# Patient Record
Sex: FEMALE
[De-identification: names, ages, dates, MRNs, and addresses within clinical notes are randomized per-mention and may not be internally consistent; named-entity substitution may affect disease eponyms.]

---

## 2017-04-28 PROBLEM — H02.006: Status: ACTIVE | Noted: 2017-04-28

## 2017-04-28 PROBLEM — H35.363 RETINAL DRUSEN, BILATERAL: Status: ACTIVE | Noted: 2017-04-28

## 2017-04-28 PROBLEM — H02.003 ENTROPION AND TRICHIASIS OF EYELID, RIGHT: Status: ACTIVE | Noted: 2017-04-28

## 2017-04-28 PROBLEM — Z96.1 PSEUDOPHAKIA OF BOTH EYES: Status: ACTIVE | Noted: 2017-04-28

## 2017-05-02 PROCEDURE — 83970 ASSAY OF PARATHORMONE: CPT | Performed by: INTERNAL MEDICINE

## 2017-05-02 PROCEDURE — 86334 IMMUNOFIX E-PHORESIS SERUM: CPT | Performed by: INTERNAL MEDICINE

## 2017-05-02 PROCEDURE — 83883 ASSAY NEPHELOMETRY NOT SPEC: CPT | Performed by: INTERNAL MEDICINE

## 2017-05-02 PROCEDURE — 84165 PROTEIN E-PHORESIS SERUM: CPT | Performed by: INTERNAL MEDICINE

## 2017-07-20 PROCEDURE — 87086 URINE CULTURE/COLONY COUNT: CPT | Performed by: INTERNAL MEDICINE

## 2017-07-20 PROCEDURE — 81001 URINALYSIS AUTO W/SCOPE: CPT | Performed by: INTERNAL MEDICINE

## 2017-07-21 PROCEDURE — 82570 ASSAY OF URINE CREATININE: CPT | Performed by: INTERNAL MEDICINE

## 2017-07-21 PROCEDURE — 84156 ASSAY OF PROTEIN URINE: CPT | Performed by: INTERNAL MEDICINE

## 2017-08-31 ENCOUNTER — HOSPITAL (OUTPATIENT)
Dept: OTHER | Age: 82
End: 2017-08-31
Attending: OTOLARYNGOLOGY

## 2017-09-01 ENCOUNTER — CHARTING TRANS (OUTPATIENT)
Dept: OTHER | Age: 82
End: 2017-09-01

## 2017-09-01 PROCEDURE — 82607 VITAMIN B-12: CPT | Performed by: INTERNAL MEDICINE

## 2017-09-01 PROCEDURE — 82746 ASSAY OF FOLIC ACID SERUM: CPT | Performed by: INTERNAL MEDICINE

## 2017-10-12 ENCOUNTER — HOSPITAL ENCOUNTER (OUTPATIENT)
Facility: HOSPITAL | Age: 82
Setting detail: HOSPITAL OUTPATIENT SURGERY
Discharge: HOME | End: 2017-10-12
Attending: OTOLARYNGOLOGY | Admitting: OTOLARYNGOLOGY
Payer: MEDICARE

## 2017-10-12 ENCOUNTER — SURGERY (OUTPATIENT)
Age: 82
End: 2017-10-12

## 2017-10-12 ENCOUNTER — ANESTHESIA (OUTPATIENT)
Dept: SURGERY | Facility: HOSPITAL | Age: 82
End: 2017-10-12

## 2017-10-12 ENCOUNTER — ANESTHESIA EVENT (OUTPATIENT)
Dept: SURGERY | Facility: HOSPITAL | Age: 82
End: 2017-10-12

## 2017-10-12 VITALS
DIASTOLIC BLOOD PRESSURE: 98 MMHG | TEMPERATURE: 98 F | HEART RATE: 76 BPM | RESPIRATION RATE: 16 BRPM | HEIGHT: 68 IN | SYSTOLIC BLOOD PRESSURE: 134 MMHG | OXYGEN SATURATION: 96 % | WEIGHT: 145.5 LBS | BODY MASS INDEX: 22.05 KG/M2

## 2017-10-12 DIAGNOSIS — H02.003 ENTROPION OF RIGHT EYELID: ICD-10-CM

## 2017-10-12 PROCEDURE — 08SQ0ZZ REPOSITION RIGHT LOWER EYELID, OPEN APPROACH: ICD-10-PCS | Performed by: OTOLARYNGOLOGY

## 2017-10-12 RX ORDER — LIDOCAINE HYDROCHLORIDE AND EPINEPHRINE 10; 10 MG/ML; UG/ML
INJECTION, SOLUTION INFILTRATION; PERINEURAL AS NEEDED
Status: DISCONTINUED | OUTPATIENT
Start: 2017-10-12 | End: 2017-10-12 | Stop reason: HOSPADM

## 2017-10-12 RX ORDER — ACETAMINOPHEN 500 MG
1000 TABLET ORAL ONCE AS NEEDED
Status: DISCONTINUED | OUTPATIENT
Start: 2017-10-12 | End: 2017-10-12

## 2017-10-12 RX ORDER — MEPERIDINE HYDROCHLORIDE 25 MG/ML
12.5 INJECTION INTRAMUSCULAR; INTRAVENOUS; SUBCUTANEOUS AS NEEDED
Status: DISCONTINUED | OUTPATIENT
Start: 2017-10-12 | End: 2017-10-12

## 2017-10-12 RX ORDER — SODIUM CHLORIDE, SODIUM LACTATE, POTASSIUM CHLORIDE, CALCIUM CHLORIDE 600; 310; 30; 20 MG/100ML; MG/100ML; MG/100ML; MG/100ML
INJECTION, SOLUTION INTRAVENOUS CONTINUOUS
Status: DISCONTINUED | OUTPATIENT
Start: 2017-10-12 | End: 2017-10-12

## 2017-10-12 RX ORDER — MIDAZOLAM HYDROCHLORIDE 1 MG/ML
1 INJECTION INTRAMUSCULAR; INTRAVENOUS EVERY 5 MIN PRN
Status: DISCONTINUED | OUTPATIENT
Start: 2017-10-12 | End: 2017-10-12

## 2017-10-12 RX ORDER — HYDROCODONE BITARTRATE AND ACETAMINOPHEN 5; 325 MG/1; MG/1
2 TABLET ORAL AS NEEDED
Status: DISCONTINUED | OUTPATIENT
Start: 2017-10-12 | End: 2017-10-12

## 2017-10-12 RX ORDER — METOCLOPRAMIDE HYDROCHLORIDE 5 MG/ML
10 INJECTION INTRAMUSCULAR; INTRAVENOUS AS NEEDED
Status: DISCONTINUED | OUTPATIENT
Start: 2017-10-12 | End: 2017-10-12

## 2017-10-12 RX ORDER — HYDROMORPHONE HYDROCHLORIDE 1 MG/ML
0.4 INJECTION, SOLUTION INTRAMUSCULAR; INTRAVENOUS; SUBCUTANEOUS EVERY 5 MIN PRN
Status: DISCONTINUED | OUTPATIENT
Start: 2017-10-12 | End: 2017-10-12

## 2017-10-12 RX ORDER — ONDANSETRON 2 MG/ML
4 INJECTION INTRAMUSCULAR; INTRAVENOUS AS NEEDED
Status: DISCONTINUED | OUTPATIENT
Start: 2017-10-12 | End: 2017-10-12

## 2017-10-12 RX ORDER — BACITRACIN 500 [USP'U]/G
OINTMENT OPHTHALMIC AS NEEDED
Status: DISCONTINUED | OUTPATIENT
Start: 2017-10-12 | End: 2017-10-12

## 2017-10-12 RX ORDER — HYDROCODONE BITARTRATE AND ACETAMINOPHEN 5; 325 MG/1; MG/1
1 TABLET ORAL AS NEEDED
Status: DISCONTINUED | OUTPATIENT
Start: 2017-10-12 | End: 2017-10-12

## 2017-10-12 RX ORDER — CEFAZOLIN SODIUM 1 G/3ML
INJECTION, POWDER, FOR SOLUTION INTRAMUSCULAR; INTRAVENOUS
Status: DISCONTINUED | OUTPATIENT
Start: 2017-10-12 | End: 2017-10-12 | Stop reason: HOSPADM

## 2017-10-12 RX ORDER — CEPHALEXIN 500 MG/1
500 CAPSULE ORAL 3 TIMES DAILY
Qty: 21 CAPSULE | Refills: 0 | Status: SHIPPED | OUTPATIENT
Start: 2017-10-12 | End: 2017-10-19

## 2017-10-12 RX ORDER — NALOXONE HYDROCHLORIDE 0.4 MG/ML
80 INJECTION, SOLUTION INTRAMUSCULAR; INTRAVENOUS; SUBCUTANEOUS AS NEEDED
Status: DISCONTINUED | OUTPATIENT
Start: 2017-10-12 | End: 2017-10-12

## 2017-10-12 NOTE — BRIEF OP NOTE
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

## 2017-10-12 NOTE — H&P
CHIEF COMPLAINT: right lower lid is turned inward.      HISTORY OF PRESENT ILLNESS: This is an 18-year-old female kindly by Dr. Nicky Sanchez for evaluation of entropion. The patient has had the lid turning in for the past 2 months.  She does get some occasional tear Patient with a lower lid entropion on the right. We discussed the risks and benefits of performing a lower lid tightening to address this.  She understands the risks and benefits and is interested in proceeding.    All of her questions were answered to the

## 2017-10-12 NOTE — ANESTHESIA POSTPROCEDURE EVALUATION
[facility] Patient Status:  Hospital Outpatient Surgery   Age/Gender 81year old female MRN [id number]   Location [facility] Attending Loretta Mcfarland MD   [facility] Day # 0 PCP Anderson Garza MD

## 2017-10-12 NOTE — OPERATIVE REPORT
[facility]    PATIENT'S NAME: Nessa Reyez   ATTENDING PHYSICIAN: Sharif Becerra M.D. OPERATING PHYSICIAN: Sharif Becerra M.D.    PATIENT ACCOUNT#:   [de-identified]    LOCATION:  [address] [place] [address] 10  MEDICAL RECORD #:   [id number] crow's feet for approximately 1.5 cm. I then dissected down through skin in the lateral canthal area and then through the orbicularis muscle. I identified the avascular plane between the orbital septum and the orbicularis.   I spread my tenotomy scissors running fashion. That portion measured 1.5 cm of the 2-layer closure. The eye was then irrigated with copious amounts of BSS. Bacitracin ophthalmologic was placed over the lower lid suture line. At this point, the procedure was terminated.   She tolerat

## 2017-10-12 NOTE — ANESTHESIA PREPROCEDURE EVALUATION
PRE-OP EVALUATION    Patient Name: Sean López    Pre-op Diagnosis: Entropion of right eyelid [A18.260]    Procedure(s):  RIGHT LOWER LID ENTROPION REPAIR    Surgeon(s) and Role:     Tia Murillo MD - Primary    Pre-op vitals reviewed. 09/01/2017       Lab Results  Component Value Date    09/01/2017   K 3.8 09/01/2017   K 3.90 07/18/2017    09/01/2017   CO2 31.6 09/01/2017   BUN 27 (H) 09/01/2017   CREATSERUM 1.25 (H) 09/01/2017   GLU 74 09/01/2017   CA 9.7 07/21/2017

## 2019-11-13 PROBLEM — I50.22 CHRONIC SYSTOLIC CHF (CONGESTIVE HEART FAILURE) (HCC): Status: ACTIVE | Noted: 2019-11-13

## 2019-12-13 ENCOUNTER — HOSPITAL ENCOUNTER (OUTPATIENT)
Dept: INTERVENTIONAL RADIOLOGY/VASCULAR | Facility: HOSPITAL | Age: 84
Discharge: HOME | End: 2019-12-13
Attending: INTERNAL MEDICINE | Admitting: INTERNAL MEDICINE
Payer: MEDICARE

## 2019-12-13 VITALS
SYSTOLIC BLOOD PRESSURE: 149 MMHG | TEMPERATURE: 98 F | WEIGHT: 146 LBS | HEIGHT: 68 IN | HEART RATE: 62 BPM | RESPIRATION RATE: 15 BRPM | OXYGEN SATURATION: 96 % | BODY MASS INDEX: 22.13 KG/M2 | DIASTOLIC BLOOD PRESSURE: 81 MMHG

## 2019-12-13 DIAGNOSIS — I48.0 PAF (PAROXYSMAL ATRIAL FIBRILLATION) (HCC): ICD-10-CM

## 2019-12-13 PROCEDURE — 5A2204Z RESTORATION OF CARDIAC RHYTHM, SINGLE: ICD-10-PCS | Performed by: INTERNAL MEDICINE

## 2019-12-13 PROCEDURE — 93005 ELECTROCARDIOGRAM TRACING: CPT

## 2019-12-13 PROCEDURE — 80048 BASIC METABOLIC PNL TOTAL CA: CPT | Performed by: INTERNAL MEDICINE

## 2019-12-13 PROCEDURE — 93010 ELECTROCARDIOGRAM REPORT: CPT | Performed by: INTERNAL MEDICINE

## 2019-12-13 PROCEDURE — 92960 CARDIOVERSION ELECTRIC EXT: CPT

## 2019-12-13 PROCEDURE — 36415 COLL VENOUS BLD VENIPUNCTURE: CPT

## 2019-12-13 RX ORDER — SODIUM CHLORIDE 9 MG/ML
INJECTION, SOLUTION INTRAVENOUS CONTINUOUS
Status: DISCONTINUED | OUTPATIENT
Start: 2019-12-13 | End: 2019-12-13

## 2019-12-13 RX ORDER — AMIODARONE HYDROCHLORIDE 200 MG/1
200 TABLET ORAL DAILY
Qty: 90 TABLET | Refills: 3 | Status: SHIPPED | OUTPATIENT
Start: 2019-12-13 | End: 2020-01-10

## 2019-12-13 RX ADMIN — Medication 30 MG: at 11:49:00

## 2019-12-13 RX ADMIN — SODIUM CHLORIDE: 9 INJECTION, SOLUTION INTRAVENOUS at 10:15:00

## 2019-12-13 NOTE — PROGRESS NOTES
Cardioversion completed at bedside with Dr. Al Zuniga without complications. Pt A/Ox4. DEREK. VSS. 12-lead EKG complete, NSR post-cv. Denies any pain or discomfort.  E-script for amiodarone sent and AVS/discharge instructions given -- patient verbalized unders

## 2019-12-13 NOTE — H&P
81year old female     She lives in [facility] but visits her son here in the summers.     She had an episode of atrial fibrillation in 2000     She gets episodes of palpitations lasting 30 minutes every 3 months that don't bother her much.     Recently nicole Resp 18   Ht 5' 8\" (1.727 m)   Wt 146 lb (66.2 kg)   SpO2 96%   BMI 22.20 kg/m²   GENERAL: well developed, well nourished, in no apparent distress  EYES: sclera anicteric  HEENT: normocephalic  NECK: no JVD, no carotid bruits, no thyromegaly  RESPIRATOR

## 2019-12-14 PROBLEM — Z95.810 S/P IMPLANTATION OF AUTOMATIC CARDIOVERTER/DEFIBRILLATOR (AICD): Status: RESOLVED | Noted: 2019-12-14 | Resolved: 2019-12-14

## 2019-12-14 PROBLEM — Z79.01 CURRENT USE OF LONG TERM ANTICOAGULATION: Status: ACTIVE | Noted: 2019-12-14

## 2019-12-14 PROBLEM — Z79.899 LONG TERM CURRENT USE OF AMIODARONE: Status: ACTIVE | Noted: 2019-12-14

## 2019-12-14 PROBLEM — Z98.890 HISTORY OF CARDIOVERSION: Status: ACTIVE | Noted: 2019-12-14

## 2019-12-14 PROBLEM — Z95.810 S/P IMPLANTATION OF AUTOMATIC CARDIOVERTER/DEFIBRILLATOR (AICD): Status: ACTIVE | Noted: 2019-12-14

## 2019-12-14 PROBLEM — Z92.89 HISTORY OF CARDIOVERSION: Status: ACTIVE | Noted: 2019-12-14

## 2021-07-22 PROBLEM — D68.69 SECONDARY HYPERCOAGULABLE STATE (HCC): Status: ACTIVE | Noted: 2021-07-22

## 2021-08-22 PROBLEM — M25.561 CHRONIC PAIN OF BOTH KNEES: Status: RESOLVED | Noted: 2021-08-22 | Resolved: 2021-08-22

## 2021-08-22 PROBLEM — M25.561 CHRONIC PAIN OF BOTH KNEES: Status: ACTIVE | Noted: 2021-08-22

## 2021-08-22 PROBLEM — M25.562 CHRONIC PAIN OF BOTH KNEES: Status: RESOLVED | Noted: 2021-08-22 | Resolved: 2021-08-22

## 2021-08-22 PROBLEM — G89.29 CHRONIC PAIN OF RIGHT KNEE: Status: ACTIVE | Noted: 2021-08-22

## 2021-08-22 PROBLEM — M25.562 CHRONIC PAIN OF BOTH KNEES: Status: ACTIVE | Noted: 2021-08-22

## 2021-08-22 PROBLEM — G89.29 CHRONIC PAIN OF BOTH KNEES: Status: RESOLVED | Noted: 2021-08-22 | Resolved: 2021-08-22

## 2021-08-22 PROBLEM — M25.561 CHRONIC PAIN OF RIGHT KNEE: Status: ACTIVE | Noted: 2021-08-22

## 2021-08-22 PROBLEM — G89.29 CHRONIC PAIN OF BOTH KNEES: Status: ACTIVE | Noted: 2021-08-22

## 2021-09-22 PROBLEM — I70.0 AORTIC ATHEROSCLEROSIS: Status: ACTIVE | Noted: 2021-09-22

## 2021-09-22 PROBLEM — I77.1 TORTUOUS AORTA: Status: ACTIVE | Noted: 2021-09-22

## 2021-09-22 PROBLEM — I77.1 TORTUOUS AORTA (HCC): Status: ACTIVE | Noted: 2021-09-22

## 2021-09-22 PROBLEM — I70.0 AORTIC ATHEROSCLEROSIS (HCC): Status: ACTIVE | Noted: 2021-09-22

## 2024-12-07 ENCOUNTER — HOSPITAL ENCOUNTER (INPATIENT)
Facility: HOSPITAL | Age: 89
LOS: 3 days | Discharge: HOME | End: 2024-12-10
Attending: EMERGENCY MEDICINE | Admitting: HOSPITALIST
Payer: MEDICARE

## 2024-12-07 ENCOUNTER — APPOINTMENT (OUTPATIENT)
Dept: GENERAL RADIOLOGY | Facility: HOSPITAL | Age: 89
End: 2024-12-07
Attending: EMERGENCY MEDICINE
Payer: MEDICARE

## 2024-12-07 DIAGNOSIS — I1A.0 RESISTANT HYPERTENSION: ICD-10-CM

## 2024-12-07 DIAGNOSIS — I50.9 ACUTE CONGESTIVE HEART FAILURE, UNSPECIFIED HEART FAILURE TYPE (HCC): Primary | ICD-10-CM

## 2024-12-07 LAB
Q-T INTERVAL: 350 MS
QRS DURATION: 80 MS
QTC CALCULATION (BEZET): 451 MS
R AXIS: -31 DEGREES
T AXIS: 63 DEGREES
VENTRICULAR RATE: 100 BPM

## 2024-12-07 PROCEDURE — 99285 EMERGENCY DEPT VISIT HI MDM: CPT

## 2024-12-07 PROCEDURE — 71045 X-RAY EXAM CHEST 1 VIEW: CPT | Performed by: EMERGENCY MEDICINE

## 2024-12-07 PROCEDURE — 93010 ELECTROCARDIOGRAM REPORT: CPT

## 2024-12-07 PROCEDURE — 96374 THER/PROPH/DIAG INJ IV PUSH: CPT

## 2024-12-07 PROCEDURE — 96375 TX/PRO/DX INJ NEW DRUG ADDON: CPT

## 2024-12-07 PROCEDURE — 93005 ELECTROCARDIOGRAM TRACING: CPT

## 2024-12-07 RX ORDER — AMOXICILLIN 500 MG/1
500 CAPSULE ORAL 2 TIMES DAILY
COMMUNITY
Start: 2024-12-05 | End: 2024-12-10

## 2024-12-07 RX ORDER — AMIODARONE HYDROCHLORIDE 200 MG/1
200 TABLET ORAL DAILY
Status: DISCONTINUED | OUTPATIENT
Start: 2024-12-08 | End: 2024-12-10

## 2024-12-07 RX ORDER — SENNOSIDES 8.6 MG
17.2 TABLET ORAL NIGHTLY PRN
Status: DISCONTINUED | OUTPATIENT
Start: 2024-12-07 | End: 2024-12-10

## 2024-12-07 RX ORDER — SODIUM PHOSPHATE, DIBASIC AND SODIUM PHOSPHATE, MONOBASIC 7; 19 G/230ML; G/230ML
1 ENEMA RECTAL ONCE AS NEEDED
Status: DISCONTINUED | OUTPATIENT
Start: 2024-12-07 | End: 2024-12-10

## 2024-12-07 RX ORDER — FUROSEMIDE 10 MG/ML
40 INJECTION INTRAMUSCULAR; INTRAVENOUS ONCE
Status: COMPLETED | OUTPATIENT
Start: 2024-12-07 | End: 2024-12-07

## 2024-12-07 RX ORDER — DILTIAZEM HYDROCHLORIDE 5 MG/ML
10 INJECTION INTRAVENOUS ONCE
Status: COMPLETED | OUTPATIENT
Start: 2024-12-07 | End: 2024-12-07

## 2024-12-07 RX ORDER — ONDANSETRON 2 MG/ML
4 INJECTION INTRAMUSCULAR; INTRAVENOUS EVERY 6 HOURS PRN
Status: DISCONTINUED | OUTPATIENT
Start: 2024-12-07 | End: 2024-12-10

## 2024-12-07 RX ORDER — METOCLOPRAMIDE HYDROCHLORIDE 5 MG/ML
10 INJECTION INTRAMUSCULAR; INTRAVENOUS EVERY 8 HOURS PRN
Status: DISCONTINUED | OUTPATIENT
Start: 2024-12-07 | End: 2024-12-10

## 2024-12-07 RX ORDER — LABETALOL HYDROCHLORIDE 5 MG/ML
20 INJECTION, SOLUTION INTRAVENOUS ONCE
Status: COMPLETED | OUTPATIENT
Start: 2024-12-07 | End: 2024-12-07

## 2024-12-07 RX ORDER — LABETALOL HYDROCHLORIDE 5 MG/ML
10 INJECTION, SOLUTION INTRAVENOUS EVERY 4 HOURS PRN
Status: DISCONTINUED | OUTPATIENT
Start: 2024-12-07 | End: 2024-12-10

## 2024-12-07 RX ORDER — BISACODYL 10 MG
10 SUPPOSITORY, RECTAL RECTAL
Status: DISCONTINUED | OUTPATIENT
Start: 2024-12-07 | End: 2024-12-10

## 2024-12-07 RX ORDER — FUROSEMIDE 10 MG/ML
40 INJECTION INTRAMUSCULAR; INTRAVENOUS
Status: DISCONTINUED | OUTPATIENT
Start: 2024-12-07 | End: 2024-12-10

## 2024-12-07 RX ORDER — POLYETHYLENE GLYCOL 3350 17 G/17G
17 POWDER, FOR SOLUTION ORAL DAILY PRN
Status: DISCONTINUED | OUTPATIENT
Start: 2024-12-07 | End: 2024-12-10

## 2024-12-07 RX ORDER — ACETAMINOPHEN 500 MG
500 TABLET ORAL EVERY 4 HOURS PRN
Status: DISCONTINUED | OUTPATIENT
Start: 2024-12-07 | End: 2024-12-10

## 2024-12-07 NOTE — PROGRESS NOTES
12/07/24 1600 12/07/24 1611 12/07/24 1615   Vital Signs   BP (!) 162/112 (!) 131/101 (!) 161/94   MAP (mmHg) (!) 129 (!) 111 (!) 106   BP Location Left arm Left arm Left arm   BP Method Automatic Automatic Automatic   Patient Position Lying Sitting Standing

## 2024-12-07 NOTE — ED PROVIDER NOTES
Patient Seen in: [facility] Emergency Department      History     Chief Complaint   Patient presents with    Difficulty Breathing     c/o 2-3 weeks CHIP, dyspnea on exertion, worse when laying flat, elevated trop, BNP 8000, d dimer negative, 100% on RA, hx of afib/chf, coming private vehicle     Stated Complaint:     Subjective:   HPI      Patient comes to the emergency department with 2 to 3 weeks of increasing shortness of breath, particular with exertion and when lying flat.  The patient has had no chest pain.  She does have a previous history of atrial fibrillation which was well-controlled in recent months.  Patient went to [facility] immediate care today and was noted to have a slightly elevated troponin along with a significantly elevated beta natruretic peptide.  She has had no syncope or near syncope.  She has had no weakness.  She has noted some edema in both her lower extremities which has been present over the past week.    Objective:     Past Medical History:    Atrial fibrillation (HCC)    EP cards: Dr. David    Chronic renal insufficiency, stage 3 (moderate) (HCC)    History of cervical cancer    stage 1B    Osteoarthritis    left hip    Osteoporosis    rheum: Dr. Sammy Rodriges              Past Surgical History:   Procedure Laterality Date    Colonoscopy      2005, unremarkable    Remv cataract extracap,insert lens  8/22/2012    Procedure: LEFT PHACOEMULSIFICATION OF CATARACT WITH INTRAOCULAR LENS IMPLANT [id number];  Surgeon: Paramjit Chin MD;  Location: [facility], [facility]    Total abdom hysterectomy                  Social History     Socioeconomic History    Marital status:     Number of children: 2   Occupational History    Occupation: Retired head of security [place]   Tobacco Use    Smoking status: Never    Smokeless tobacco: Never   Vaping Use    Vaping status: Never Used   Substance and Sexual Activity    Alcohol use: No     Alcohol/week: 0.0 standard drinks of  alcohol    Drug use: No                  Physical Exam     ED Triage Vitals   BP 12/07/24 1440 (!) 181/122   Pulse 12/07/24 1438 103   Resp 12/07/24 1438 22   Temp 12/07/24 1438 97.3 °F (36.3 °C)   Temp src 12/07/24 1438 Temporal   SpO2 12/07/24 1438 97 %   O2 Device 12/07/24 1438 None (Room air)       Current Vitals:   Vital Signs  BP: (!) 181/122  Pulse: 96  Resp: 12  Temp: 97.3 °F (36.3 °C)  Temp src: Temporal    Oxygen Therapy  SpO2: 100 %  O2 Device: None (Room air)        Physical Exam  Vitals and nursing note reviewed.   Constitutional:       Appearance: She is well-developed.   HENT:      Head: Normocephalic.   Cardiovascular:      Rate and Rhythm: Tachycardia present. Rhythm irregular.      Heart sounds: Normal heart sounds. No murmur heard.  Pulmonary:      Effort: Pulmonary effort is normal. No respiratory distress.      Breath sounds: Normal breath sounds.   Abdominal:      General: Bowel sounds are normal.      Palpations: Abdomen is soft.      Tenderness: There is no abdominal tenderness. There is no rebound.   Musculoskeletal:         General: No tenderness. Normal range of motion.      Cervical back: Normal range of motion and neck supple.      Right lower leg: Edema present.      Left lower leg: Edema present.      Comments: Bilateral symmetric lower extremity edema   Lymphadenopathy:      Cervical: No cervical adenopathy.   Skin:     General: Skin is warm and dry.      Findings: No rash.   Neurological:      Mental Status: She is alert and oriented to person, place, and time.      Sensory: No sensory deficit.            ED Course   Labs Reviewed - No data to display  EKG    Rate, intervals and axes as noted on EKG Report.  Rate: 100  Rhythm: Atrial Fibrillation  Reading: No obvious acute ischemic ST or T wave abnormalities           ED Course as of 12/07/24 1524  ------------------------------------------------------------  Time: 12/07 1523  Value: BP(!): 155/94  Comment: After patient received  labetalol       Review of patient's records from [facility] notes that patient's beta nitric peptide is elevated at 8368.  High limit of normal is 450.  Additionally, patient's troponin was noted to be elevated at 18.68 with a high limit of normal at 14.  Patient CBC is unremarkable.  BUN and creatinine were elevated, but consistent with her baseline.       MDM      Patient comes to the emergency department with worsening shortness of breath and orthopnea.  Differential diagnosis includes heart failure and acute myocardial ischemia.  Patient was noted to have elevated blood pressure in the emergency department.  I consulted with cardiology who recommended proceeding with Lasix and to address the patient's blood pressure, given the patient's history of atrial fibrillation, felt that labetalol would be the most appropriate current medication.  Because the patient is otherwise in no distress, he did not feel that the patient required admission to the ICU.  While in the emergency department, patient was given Lasix and labetalol.  She remained comfortable while in the emergency department.  She was hospitalized and sent to cardiac telemetry.    Admission disposition: 12/7/2024  3:10 PM           Medical Decision Making      Disposition and Plan     Clinical Impression:  1. Acute congestive heart failure, unspecified heart failure type (HCC)    2. Resistant hypertension         Disposition:  Admit  12/7/2024  3:10 pm    Follow-up:  No follow-up provider specified.        Medications Prescribed:  Current Discharge Medication List              Supplementary Documentation:         Hospital Problems       Present on Admission  Date Reviewed: 9/7/2021            ICD-10-CM Noted POA    * (Principal) Acute on chronic congestive heart failure, unspecified heart failure type (HCC) I50.9 12/7/2024 Unknown          A total of 35 minutes of critical care time (exclusive of billable procedures) was administered to manage the  patient's unstable vital signs and cardiovascular instability due to her CHF and hypertension.  This involved direct patient intervention, complex decision making, and/or extensive discussions with the patient, family, and clinical staff.

## 2024-12-07 NOTE — ED INITIAL ASSESSMENT (HPI)
Pt c/o CHIP x2-3 weeks, dyspnea with exertion. Worse when lying flat. Pt had elevated troponin, BNP 8000 d-dimer negative that were drawn today at . Hx afib/chf.

## 2024-12-07 NOTE — PLAN OF CARE
Assumed care of pt from ER around 1600. A/ox4, rm air, afib on tele. No reports of pain. Breath sounds clear/diminished upon auscultation. Denies cough. Edema present in bilateral ankles and feet. Skin intact -- skin check completed w/ CATHI kendall.    Discussed POC w/ pt.    1835: Pt's BP has been elevated since patient arrived to floor. HR also ranging from 100s-140s -- notified cardiologist. Received orders to start cardizem drip protocol & give 10mg bolus     Problem: Patient/Family Goals  Goal: Patient/Family Long Term Goal  Description: Patient's Long Term Goal: to go home    Interventions:  - MD rounding, medication management, monitor labs, pain control, monitor breathing, echo, diuretics  - See additional Care Plan goals for specific interventions  Outcome: Progressing  Goal: Patient/Family Short Term Goal  Description: Patient's Short Term Goal: remain pain free    Interventions:   - pain assessments q4, pain meds PRN  - See additional Care Plan goals for specific interventions  Outcome: Progressing     Problem: CARDIOVASCULAR - ADULT  Goal: Maintains optimal cardiac output and hemodynamic stability  Description: INTERVENTIONS:  - Monitor vital signs, rhythm, and trends  - Monitor for bleeding, hypotension and signs of decreased cardiac output  - Evaluate effectiveness of vasoactive medications to optimize hemodynamic stability  - Monitor arterial and/or venous puncture sites for bleeding and/or hematoma  - Assess quality of pulses, skin color and temperature  - Assess for signs of decreased coronary artery perfusion - ex. Angina  - Evaluate fluid balance, assess for edema, trend weights  Outcome: Progressing  Goal: Absence of cardiac arrhythmias or at baseline  Description: INTERVENTIONS:  - Continuous cardiac monitoring, monitor vital signs, obtain 12 lead EKG if indicated  - Evaluate effectiveness of antiarrhythmic and heart rate control medications as ordered  - Initiate emergency measures for life  threatening arrhythmias  - Monitor electrolytes and administer replacement therapy as ordered  Outcome: Progressing     Problem: RESPIRATORY - ADULT  Goal: Achieves optimal ventilation and oxygenation  Description: INTERVENTIONS:  - Assess for changes in respiratory status  - Assess for changes in mentation and behavior  - Position to facilitate oxygenation and minimize respiratory effort  - Oxygen supplementation based on oxygen saturation or ABGs  - Provide Smoking Cessation handout, if applicable  - Encourage broncho-pulmonary hygiene including cough, deep breathe, Incentive Spirometry  - Assess the need for suctioning and perform as needed  - Assess and instruct to report SOB or any respiratory difficulty  - Respiratory Therapy support as indicated  - Manage/alleviate anxiety  - Monitor for signs/symptoms of CO2 retention  Outcome: Progressing

## 2024-12-07 NOTE — H&P
[facility] Hospitalist History and Physical      Chief Complaint   Patient presents with    Difficulty Breathing     c/o 2-3 weeks CHIP, dyspnea on exertion, worse when laying flat, elevated trop, BNP 8000, d dimer negative, 100% on RA, hx of afib/chf, coming private vehicle        PCP: Elias Swanson MD      History of Present Illness: Patient is a 89 year old female with PMH sig for PAF on eliquis, CKD3 came from [facility] with dyspnea. Symptoms ongoing for a few weeks. Worsens on exertion and when lying flat. Was seen by [facility] a few days ago for congestion and started on amoxicillin with no improvement. In [facility] labs were drawn with BNP of 8K. Mild troponin elevation. Sent to [facility] for further evaluation and treatment. In the ED she was hypertensive and HR in 90s. CXR not done. Given a dose of IV lasix and labetalol, cardiology consulted and will be admitted for further evaluation and treatment.     Past Medical History:    Atrial fibrillation (HCC)    EP cards: Dr. David    Chronic renal insufficiency, stage 3 (moderate) (HCC)    History of cervical cancer    stage 1B    Osteoarthritis    left hip    Osteoporosis    rheum: Dr. Sammy Rodriges      Past Surgical History:   Procedure Laterality Date    Colonoscopy      2005, unremarkable    Remv cataract extracap,insert lens  8/22/2012    Procedure: LEFT PHACOEMULSIFICATION OF CATARACT WITH INTRAOCULAR LENS IMPLANT [id number];  Surgeon: Paramjit Chin MD;  Location: [facility], [facility]    Total abdom hysterectomy          ALL:  Allergies[1]     Prior to Admission Medications   Medication Sig    ELIQUIS 5 MG Oral Tab TAKE 1 TABLET BY MOUTH  TWICE DAILY    Ergocalciferol (VITAMIN D OR) Take by mouth.    amiodarone HCl 200 MG Oral Tab Take 1 tablet (200 mg total) by mouth daily.    Enalapril Maleate 2.5 MG Oral Tab Take 1 tablet (2.5 mg total) by mouth daily.       Social History     Tobacco Use    Smoking status: Never    Smokeless tobacco: Never   Substance Use Topics     Alcohol use: No     Alcohol/week: 0.0 standard drinks of alcohol        Fam Hx  History reviewed. No pertinent family history.    Review of Systems  Comprehensive ROS reviewed and negative except for what is stated in HPI.      OBJECTIVE:  BP (!) 155/94   Pulse 104   Temp 97.3 °F (36.3 °C) (Temporal)   Resp 24   Ht 5' 7\" (1.702 m)   Wt 142 lb (64.4 kg)   SpO2 97%   BMI 22.24 kg/m²   General:  Alert, no distress, appears stated age.    Head:  Normocephalic, without obvious abnormality, atraumatic.   Eyes:  Sclera anicteric, No conjunctival pallor, EOMs intact.    Nose: Nares normal. Septum midline. Mucosa normal. No drainage.   Throat: Lips, mucosa, and tongue normal. Teeth and gums normal.   Neck: Supple, symmetrical, trachea midline, no cervical or supraclavicular lymph adenopathy, thyroid: no enlargment/tenderness/nodules appreciated   Lungs:   Mild bibasilar crackles. Normal effort   Chest wall:  No tenderness or deformity.   Heart:  Regular rate and rhythm, S1, S2 normal, no murmur, rub or gallop appreciated   Abdomen:   Soft, non-tender. Bowel sounds normal. No masses,  No organomegaly. Non distended   Extremities: Extremities normal, atraumatic, no cyanosis, trace BLE edema.   Skin: Skin color, texture, turgor normal. No rashes or lesions.    Neurologic: Normal strength, no focal deficit appreciated     Data Review:    LABS:        CXR: All imaging personally reviewed.      Radiology: No results found.     Assessment/Plan:     89 year old female with PMH sig for PAF on eliquis, CKD3 came from [facility] with dyspnea.     Dyspnea  Acute on chronic HFpEF exacerbation  - cont IV lasix BID  - daily weights, I/Os  - update echo  - cardiology consulted    PAF with AVR  - amio  - will start diltiazem infusion   - eliquis   - tele     Hypertensive urgency  - prn labetalol  - hold enalapril while diuresing    CKD3  - monitor with diuresis  - avoid nephrotoxins    FEN: low salt diet, PT/OT  Proph: SCDs,  eliquis  Code status: Full code     Critical care time >35 mins     Outpatient records or previous hospital records reviewed.   [facility] hospitalist to continue to follow patient while in house      Sunny Botello MD  [facility]  Hospitalist  Message over [facility] [employer]  Pager: [phone]                 [1] No Known Allergies

## 2024-12-08 ENCOUNTER — APPOINTMENT (OUTPATIENT)
Dept: CV DIAGNOSTICS | Facility: HOSPITAL | Age: 89
End: 2024-12-08
Attending: HOSPITALIST
Payer: MEDICARE

## 2024-12-08 LAB
ALBUMIN SERPL-MCNC: 3.6 G/DL (ref 3.2–4.8)
ALBUMIN/GLOB SERPL: 1.4 {RATIO} (ref 1–2)
ALP LIVER SERPL-CCNC: 154 U/L
ALT SERPL-CCNC: 97 U/L
ANION GAP SERPL CALC-SCNC: 8 MMOL/L (ref 0–18)
AST SERPL-CCNC: 67 U/L (ref ?–34)
BASOPHILS # BLD AUTO: 0.06 X10(3) UL (ref 0–0.2)
BASOPHILS NFR BLD AUTO: 0.9 %
BILIRUB SERPL-MCNC: 0.7 MG/DL (ref 0.2–1.1)
BUN BLD-MCNC: 39 MG/DL (ref 9–23)
CALCIUM BLD-MCNC: 9.1 MG/DL (ref 8.7–10.4)
CHLORIDE SERPL-SCNC: 109 MMOL/L (ref 98–112)
CO2 SERPL-SCNC: 30 MMOL/L (ref 21–32)
CREAT BLD-MCNC: 1.6 MG/DL
EGFRCR SERPLBLD CKD-EPI 2021: 31 ML/MIN/1.73M2 (ref 60–?)
EOSINOPHIL # BLD AUTO: 0.07 X10(3) UL (ref 0–0.7)
EOSINOPHIL NFR BLD AUTO: 1.1 %
ERYTHROCYTE [DISTWIDTH] IN BLOOD BY AUTOMATED COUNT: 13.9 %
GLOBULIN PLAS-MCNC: 2.5 G/DL (ref 2–3.5)
GLUCOSE BLD-MCNC: 108 MG/DL (ref 70–99)
HCT VFR BLD AUTO: 38.3 %
HGB BLD-MCNC: 12.4 G/DL
IMM GRANULOCYTES # BLD AUTO: 0.02 X10(3) UL (ref 0–1)
IMM GRANULOCYTES NFR BLD: 0.3 %
LYMPHOCYTES # BLD AUTO: 1.56 X10(3) UL (ref 1–4)
LYMPHOCYTES NFR BLD AUTO: 23.7 %
MAGNESIUM SERPL-MCNC: 1.9 MG/DL (ref 1.6–2.6)
MCH RBC QN AUTO: 29.4 PG (ref 26–34)
MCHC RBC AUTO-ENTMCNC: 32.4 G/DL (ref 31–37)
MCV RBC AUTO: 90.8 FL
MONOCYTES # BLD AUTO: 0.51 X10(3) UL (ref 0.1–1)
MONOCYTES NFR BLD AUTO: 7.8 %
NEUTROPHILS # BLD AUTO: 4.36 X10 (3) UL (ref 1.5–7.7)
NEUTROPHILS # BLD AUTO: 4.36 X10(3) UL (ref 1.5–7.7)
NEUTROPHILS NFR BLD AUTO: 66.2 %
OSMOLALITY SERPL CALC.SUM OF ELEC: 314 MOSM/KG (ref 275–295)
PLATELET # BLD AUTO: 243 10(3)UL (ref 150–450)
POTASSIUM SERPL-SCNC: 3.2 MMOL/L (ref 3.5–5.1)
POTASSIUM SERPL-SCNC: 3.5 MMOL/L (ref 3.5–5.1)
PROT SERPL-MCNC: 6.1 G/DL (ref 5.7–8.2)
RBC # BLD AUTO: 4.22 X10(6)UL
SODIUM SERPL-SCNC: 147 MMOL/L (ref 136–145)
WBC # BLD AUTO: 6.6 X10(3) UL (ref 4–11)

## 2024-12-08 PROCEDURE — 36415 COLL VENOUS BLD VENIPUNCTURE: CPT

## 2024-12-08 PROCEDURE — 83735 ASSAY OF MAGNESIUM: CPT | Performed by: HOSPITALIST

## 2024-12-08 PROCEDURE — 83880 ASSAY OF NATRIURETIC PEPTIDE: CPT | Performed by: INTERNAL MEDICINE

## 2024-12-08 PROCEDURE — 80053 COMPREHEN METABOLIC PANEL: CPT | Performed by: HOSPITALIST

## 2024-12-08 PROCEDURE — 93306 TTE W/DOPPLER COMPLETE: CPT | Performed by: HOSPITALIST

## 2024-12-08 PROCEDURE — 97530 THERAPEUTIC ACTIVITIES: CPT

## 2024-12-08 PROCEDURE — 84132 ASSAY OF SERUM POTASSIUM: CPT | Performed by: HOSPITALIST

## 2024-12-08 PROCEDURE — 85025 COMPLETE CBC W/AUTO DIFF WBC: CPT | Performed by: HOSPITALIST

## 2024-12-08 PROCEDURE — 97161 PT EVAL LOW COMPLEX 20 MIN: CPT

## 2024-12-08 RX ORDER — POTASSIUM CHLORIDE 1500 MG/1
40 TABLET, EXTENDED RELEASE ORAL EVERY 4 HOURS
Status: COMPLETED | OUTPATIENT
Start: 2024-12-08 | End: 2024-12-08

## 2024-12-08 RX ORDER — METOPROLOL SUCCINATE 50 MG/1
50 TABLET, EXTENDED RELEASE ORAL
Status: DISCONTINUED | OUTPATIENT
Start: 2024-12-08 | End: 2024-12-08

## 2024-12-08 RX ORDER — METOPROLOL SUCCINATE 50 MG/1
50 TABLET, EXTENDED RELEASE ORAL EVERY 8 HOURS
Status: DISCONTINUED | OUTPATIENT
Start: 2024-12-08 | End: 2024-12-10

## 2024-12-08 NOTE — PLAN OF CARE
Received patient from NOC RN at 0730. Patient A&O x4 and on room air, bilat lung sounds clear to diminished. Patient voiding without complications and is reporting no pain at this time. Patient is resting in chair. Fall precautions in place, call light and belongings within reach. Plan of care evolving.     POC:   - 40mg IV lasix BID  - Echo  - diltiazem gtt     Problem: Patient/Family Goals  Goal: Patient/Family Long Term Goal  Description: Patient's Long Term Goal: to go home    Interventions:  - MD rounding, medication management, monitor labs, pain control, monitor breathing, echo, diuretics  - See additional Care Plan goals for specific interventions  Outcome: Progressing  Goal: Patient/Family Short Term Goal  Description: Patient's Short Term Goal: remain pain free    Interventions:   - pain assessments q4, pain meds PRN  - See additional Care Plan goals for specific interventions  Outcome: Progressing     Problem: CARDIOVASCULAR - ADULT  Goal: Maintains optimal cardiac output and hemodynamic stability  Description: INTERVENTIONS:  - Monitor vital signs, rhythm, and trends  - Monitor for bleeding, hypotension and signs of decreased cardiac output  - Evaluate effectiveness of vasoactive medications to optimize hemodynamic stability  - Monitor arterial and/or venous puncture sites for bleeding and/or hematoma  - Assess quality of pulses, skin color and temperature  - Assess for signs of decreased coronary artery perfusion - ex. Angina  - Evaluate fluid balance, assess for edema, trend weights  Outcome: Progressing  Goal: Absence of cardiac arrhythmias or at baseline  Description: INTERVENTIONS:  - Continuous cardiac monitoring, monitor vital signs, obtain 12 lead EKG if indicated  - Evaluate effectiveness of antiarrhythmic and heart rate control medications as ordered  - Initiate emergency measures for life threatening arrhythmias  - Monitor electrolytes and administer replacement therapy as ordered  Outcome:  Progressing     Problem: RESPIRATORY - ADULT  Goal: Achieves optimal ventilation and oxygenation  Description: INTERVENTIONS:  - Assess for changes in respiratory status  - Assess for changes in mentation and behavior  - Position to facilitate oxygenation and minimize respiratory effort  - Oxygen supplementation based on oxygen saturation or ABGs  - Provide Smoking Cessation handout, if applicable  - Encourage broncho-pulmonary hygiene including cough, deep breathe, Incentive Spirometry  - Assess the need for suctioning and perform as needed  - Assess and instruct to report SOB or any respiratory difficulty  - Respiratory Therapy support as indicated  - Manage/alleviate anxiety  - Monitor for signs/symptoms of CO2 retention  Outcome: Progressing

## 2024-12-08 NOTE — PROGRESS NOTES
[facility] Hospitalist note    PCP: Elias Swanson MD    Chief Complaint:  F/u sob    SUBJECTIVE:  Breathing feeling a bit better  Remains on dilt gtt    OBJECTIVE:  Temp:  [97.3 °F (36.3 °C)-97.7 °F (36.5 °C)] 97.6 °F (36.4 °C)  Pulse:  [] 103  Resp:  [12-28] 20  BP: (111-184)/() 159/97  SpO2:  [97 %-100 %] 97 %    Intake/Output:    Intake/Output Summary (Last 24 hours) at 12/8/2024 0930  Last data filed at 12/8/2024 0757  Gross per 24 hour   Intake 30 ml   Output 1950 ml   Net -1920 ml       Last 3 Weights   12/08/24 0300 142 lb 6.7 oz (64.6 kg)   12/07/24 1615 145 lb 4.5 oz (65.9 kg)   12/07/24 1438 142 lb (64.4 kg)   09/07/21 0835 143 lb (64.9 kg)   08/31/21 1050 143 lb (64.9 kg)       Exam  Gen: No acute distress  HEENT: anicteric sclera, MMM  Pulm: Lungs clear, normal respiratory effort  CV: tachy, irregular  Abd: Abdomen soft, nontender, nondistended, no organomegaly, bowel sounds present  MSK: modest edema in LE b/l taylor by ankles/feet  Skin: no rashes or lesions  Neuro: A&OX3, no focal deficits    Data Review:         Labs:     Recent Labs   Lab 12/08/24  0705   WBC 6.6   HGB 12.4   MCV 90.8   .0   NE 4.36   LYMABS 1.56       Recent Labs   Lab 12/08/24  0705   *   K 3.2*      CO2 30.0   BUN 39*   CREATSERUM 1.60*   CA 9.1   MG 1.9   *       Recent Labs   Lab 12/08/24  0705   ALT 97*   AST 67*   ALB 3.6       No results for input(s): \"TROP\", \"CK\", \"PBNP\", \"PCT\" in the last 168 hours.    No results for input(s): \"CRP\", \"CAROLYN\", \"LDH\", \"DDIMER\" in the last 168 hours.    No results for input(s): \"PGLU\" in the last 168 hours.    Meds:   Scheduled Medication:   potassium chloride  40 mEq Oral Q4H    amiodarone  200 mg Oral Daily    apixaban  5 mg Oral BID    furosemide  40 mg Intravenous BID (Diuretic)     Continuous Infusing Medication:   dilTIAZem 5 mg/hr (12/07/24 1940)     PRN Medication:  acetaminophen    melatonin    polyethylene glycol (PEG 3350)    sennosides    bisacodyl     fleet enema    ondansetron    metoclopramide    labetalol       Microbiology:    No results found for this visit on 12/07/24.    Lab Results   Component Value Date    COVID19 NOT DETECTED 08/27/2021        Assessment/Plan:     89 year old female with PMH sig for PAF on eliquis, CKD3 came from [facility] with dyspnea.      Dyspnea  Acute on chronic HFpEF exacerbation  - cont IV lasix BID  - daily weights, I/Os. Net neg almost 2L if accurate, down about 3 lbs  - echo ordered, pending  - cardiology consulted     PAF with AVR  - amio  - on dilt gtt, trying to wean off and initiate bb  - eliquis   - tele      Hypertensive urgency- bp improving  - prn labetalol  - hold enalapril while diuresing     CKD3, baseline cr 1.4-1.7  Mild hypernatremia  - monitor Na and Cr with diuresis.   - avoid nephrotoxins     FEN: low salt diet, PT/OT  Proph: SCDs, eliquis  Code status: Full code         Angely Brambila MD  [facility] Hospitalist  Pager: [phone]

## 2024-12-08 NOTE — PHYSICAL THERAPY NOTE
PHYSICAL THERAPY EVALUATION - INPATIENT     Room Number: 2622/2622-A  Evaluation Date: 2024  Type of Evaluation: Initial  Physician Order: PT Eval and Treat    Presenting Problem: acute congestive HF  Co-Morbidities : PAF on eliquis, CKD3  Reason for Therapy: Mobility Dysfunction and Discharge Planning    PHYSICAL THERAPY ASSESSMENT   Patient is a 89 year old female admitted 2024 for acute CHF.  Prior to admission, patient's baseline is mod I with SPC.  Patient is currently functioning near baseline with bed mobility, transfers, gait, and stair negotiation.  Mild dyspnea reported with activity with education on EC and pacing strategies.     Patient has met all skilled IPPT goals at this time. Patient will be discharged from Physical Therapy services.  Please re-order if a new functional limitation presents during this admission.      PLAN DURING HOSPITALIZATION  Nursing Mobility Recommendation : 1 Assist  PT Device Recommendation: Cane                PHYSICAL THERAPY MEDICAL/SOCIAL HISTORY  History related to current admission: Patient is a 89 year old female admitted on 2024: Presented from [facility] with dyspnea dx acute on chronic HFpEF exacerbation, HTN urgency    HOME SITUATION  Type of Home: House  Home Layout: Two level                     Lives With: Son (DIl - both work from home)    Drives: No   Patient Regularly Uses: Cane      Prior Level of [place]: Uses an SPC occasionally, does chair exercises, no hx of falls    SUBJECTIVE  \"No I can't be falling I've got osteoporosis\"     OBJECTIVE  Precautions: None  Fall Risk: Standard fall risk    WEIGHT BEARING RESTRICTION     PAIN ASSESSMENT  Ratin          COGNITION  Overall Cognitive Status:  WFL - within functional limits    RANGE OF MOTION AND STRENGTH ASSESSMENT  Upper extremity ROM and strength are within functional limits     Lower extremity ROM is within functional limits     Lower extremity strength is within functional limits      BALANCE  Static Sitting: Good  Dynamic Sitting: Good  Static Standing: Fair -  Dynamic Standing: Fair -    ADDITIONAL TESTS                                    ACTIVITY TOLERANCE  Pulse:  (Afib 110-120s with activity)  Heart Rate Source: Monitor                   O2 WALK       NEUROLOGICAL FINDINGS                        AM-PAC '6-Clicks' INPATIENT SHORT FORM - BASIC MOBILITY  How much difficulty does the patient currently have...  Patient Difficulty: Turning over in bed (including adjusting bedclothes, sheets and blankets)?: None   Patient Difficulty: Sitting down on and standing up from a chair with arms (e.g., wheelchair, bedside commode, etc.): None   Patient Difficulty: Moving from lying on back to sitting on the side of the bed?: None   How much help from another person does the patient currently need...   Help from Another: Moving to and from a bed to a chair (including a wheelchair)?: None   Help from Another: Need to walk in hospital room?: None   Help from Another: Climbing 3-5 steps with a railing?: None     AM-PAC Score:  Raw Score: 24   Approx Degree of Impairment: 0%   Standardized Score (AM-PAC Scale): 61.14   CMS Modifier (G-Code): CH    FUNCTIONAL ABILITY STATUS  Gait Assessment   Functional Mobility/Gait Assessment  Gait Assistance: Modified independent  Distance (ft): 150  Assistive Device: Cane  Pattern: Within Functional Limits  Stairs: Stairs  How Many Stairs:  (5x2)  Device: 1 Rail;Cane  Assist: Modified independent  Pattern: Ascend and Descend  Ascend and Descend : Step to    Skilled Therapy Provided     Bed Mobility:  Rolling: indep  Supine to sit: indep   Sit to supine: NT     Transfer Mobility:  Sit to stand: indep   Stand to sit: indep  Gait = mod I     Therapist's Comments: Discussed case with RN prior to session initiation. Pt agreeable to participation in therapy. Gait belt donned for out of bed mobility. pt educated on energy conservation techniques including slower gait speed,  standing/seated rest breaks, pacing activities, and continued use of assistive device. Recommended and participated in stair training with cues for step to pattern        Exercise/Education Provided:  Energy conservation    Patient End of Session: Up in chair;Needs met;Call light within reach;RN aware of session/findings;All patient questions and concerns addressed      Patient Evaluation Complexity Level:  History Low - no personal factors and/or co-morbidities   Examination of body systems Low -  addressing 1-2 elements   Clinical Presentation Low- Stable   Clinical Decision Making Low Complexity       PT Session Time: 23 minutes  Gait Training:  minutes  Therapeutic Activity: 10 minutes  Neuromuscular Re-education:  minutes  Therapeutic Exercise: minutes

## 2024-12-08 NOTE — PLAN OF CARE
A&Ox4. Patient on room air. Continuous pulse ox maintained. Afib on telemetry. Cardizem gtt started infusing at beginning of shift. Eliquis administered. Pt denies chest pain or shortness of breath. Patient continent of bowel and bladder. No complaints of pain. Patient in bed with fall precautions in place. Discussed plan of care with patient. Patient verbalizes understanding.    Problem: Patient/Family Goals  Goal: Patient/Family Long Term Goal  Description: Patient's Long Term Goal: to go home    Interventions:  - MD rounding, medication management, monitor labs, pain control, monitor breathing, echo, diuretics  - See additional Care Plan goals for specific interventions  Outcome: Progressing  Goal: Patient/Family Short Term Goal  Description: Patient's Short Term Goal: remain pain free    Interventions:   - pain assessments q4, pain meds PRN  - See additional Care Plan goals for specific interventions  Outcome: Progressing     Problem: CARDIOVASCULAR - ADULT  Goal: Maintains optimal cardiac output and hemodynamic stability  Description: INTERVENTIONS:  - Monitor vital signs, rhythm, and trends  - Monitor for bleeding, hypotension and signs of decreased cardiac output  - Evaluate effectiveness of vasoactive medications to optimize hemodynamic stability  - Monitor arterial and/or venous puncture sites for bleeding and/or hematoma  - Assess quality of pulses, skin color and temperature  - Assess for signs of decreased coronary artery perfusion - ex. Angina  - Evaluate fluid balance, assess for edema, trend weights  Outcome: Progressing  Goal: Absence of cardiac arrhythmias or at baseline  Description: INTERVENTIONS:  - Continuous cardiac monitoring, monitor vital signs, obtain 12 lead EKG if indicated  - Evaluate effectiveness of antiarrhythmic and heart rate control medications as ordered  - Initiate emergency measures for life threatening arrhythmias  - Monitor electrolytes and administer replacement therapy as  ordered  Outcome: Progressing     Problem: RESPIRATORY - ADULT  Goal: Achieves optimal ventilation and oxygenation  Description: INTERVENTIONS:  - Assess for changes in respiratory status  - Assess for changes in mentation and behavior  - Position to facilitate oxygenation and minimize respiratory effort  - Oxygen supplementation based on oxygen saturation or ABGs  - Provide Smoking Cessation handout, if applicable  - Encourage broncho-pulmonary hygiene including cough, deep breathe, Incentive Spirometry  - Assess the need for suctioning and perform as needed  - Assess and instruct to report SOB or any respiratory difficulty  - Respiratory Therapy support as indicated  - Manage/alleviate anxiety  - Monitor for signs/symptoms of CO2 retention  Outcome: Progressing

## 2024-12-08 NOTE — CONSULTS
[facility] Cardiology  Consultation Note      Ursula Cox Patient Status:  Inpatient    1935 MRN [id number]   Location [facility] 2NE-A Attending Angely Bramblia MD   Hosp Day # 1 PCP Elias Swanson MD     Reason for consult: SOB    Primary cardiologist: Juan Pablo David MD     History of Present Illness:  Ursula Cox is a 89 year old female who presented to [facility] on 2024 with DONATO, edema, orthopnea over weeks. No CP. Hypertensive in ER, given labetalol. AF RVR on floor, started on dilt drip and lasix. Currently feeling much better.     Medications:  Current Facility-Administered Medications   Medication Dose Route Frequency    potassium chloride (Klor-Con M20) tab 40 mEq  40 mEq Oral Q4H    amiodarone (Pacerone) tab 200 mg  200 mg Oral Daily    apixaban (Eliquis) tab 5 mg  5 mg Oral BID    acetaminophen (Tylenol Extra Strength) tab 500 mg  500 mg Oral Q4H PRN    melatonin tab 3 mg  3 mg Oral Nightly PRN    polyethylene glycol (PEG 3350) (Miralax) 17 g oral packet 17 g  17 g Oral Daily PRN    sennosides (Senokot) tab 17.2 mg  17.2 mg Oral Nightly PRN    bisacodyl (Dulcolax) 10 MG rectal suppository 10 mg  10 mg Rectal Daily PRN    fleet enema (Fleet) rectal enema 133 mL  1 enema Rectal Once PRN    ondansetron (Zofran) 4 MG/2ML injection 4 mg  4 mg Intravenous Q6H PRN    metoclopramide (Reglan) 5 mg/mL injection 10 mg  10 mg Intravenous Q8H PRN    furosemide (Lasix) 10 mg/mL injection 40 mg  40 mg Intravenous BID (Diuretic)    dilTIAZem (cardIZEM) 100 mg in sodium chloride 0.9% 100 mL IVPB-ADDV  2.5-20 mg/hr Intravenous Continuous    labetalol (Trandate) 5 mg/mL injection 10 mg  10 mg Intravenous Q4H PRN       Past Medical History:    Atrial fibrillation (HCC)    EP cards: Dr. David    Chronic renal insufficiency, stage 3 (moderate) (HCC)    History of cervical cancer    stage 1B    Osteoarthritis    left hip    Osteoporosis    rheum: Dr. Sammy Rodriges       Past Surgical  History:   Procedure Laterality Date    Colonoscopy      , unremarkable    Remv cataract extracap,insert lens  2012    Procedure: LEFT PHACOEMULSIFICATION OF CATARACT WITH INTRAOCULAR LENS IMPLANT [id number];  Surgeon: Paramjit Chin MD;  Location: [facility], [facility]    Total abdom hysterectomy         Family History  family history is not on file.    Social History   reports that she has never smoked. She has never used smokeless tobacco. She reports that she does not drink alcohol and does not use drugs.     Allergies  Allergies[1]    Review of Systems:  As per HPI, otherwise 10 point ROS is negative in detail.    Physical Exam:  Blood pressure 130/89, pulse 98, temperature 97.6 °F (36.4 °C), temperature source Oral, resp. rate 20, height 67\", weight 142 lb 6.7 oz (64.6 kg), SpO2 100%, not currently breastfeeding.  Temp (24hrs), Av.5 °F (36.4 °C), Min:97.3 °F (36.3 °C), Max:97.7 °F (36.5 °C)    Wt Readings from Last 3 Encounters:   24 142 lb 6.7 oz (64.6 kg)   21 143 lb (64.9 kg)   21 143 lb (64.9 kg)       General: Awake and alert; in no acute distress  HEENT: Extraocular movements are intact; sclerae are anicteric; scalp is atraumatic  Neck: Supple; no JVD; no carotid bruits  Cardiac: Regular rate and regular rhythm; normal S1 and S2, no murmurs, rubs, or gallops are appreciated  Lungs: Clear to auscultation bilaterally; no accessory muscle use is noted, no wheezes, rhonci or rales  Abdomen: Soft, non-distended, non-tender; bowel sounds are normoactive  Extremities: Warm, no edema, clubbing or cyanosis; moves all 4 extremities normally, distal pulses intact and equal  Psychiatric: Normal mood and affect; answers questions appropriately  Dermatologic: No rashes; normal skin turgor    Diagnostic testing:    Labs:   Lab Results   Component Value Date    INR 1.0 (L) 2008        Lab Results   Component Value Date    WBC 6.6 2024    HGB 12.4 2024    HCT 38.3  12/08/2024    .0 12/08/2024    CREATSERUM 1.60 12/08/2024    BUN 39 12/08/2024     12/08/2024    K 3.2 12/08/2024     12/08/2024    CO2 30.0 12/08/2024     12/08/2024    CA 9.1 12/08/2024    ALB 3.6 12/08/2024    ALKPHO 154 12/08/2024    BILT 0.7 12/08/2024    TP 6.1 12/08/2024    AST 67 12/08/2024    ALT 97 12/08/2024    MG 1.9 12/08/2024       Cardiac diagnostics:    CXR 12/7/2024   CONCLUSION:  Cardiomegaly.  Mild pulmonary venous congestion.  Mild prominence of the interstitial markings in the mid-lungs and bases concerning for pulmonary edema.    Bibasilar atelectasis.    Blunting of the right costophrenic angle.     EKG 12/8/2024:   Atrial fibrillation   Left axis deviation   Moderate voltage criteria for LVH, may be normal variant ( R in aVL , Noman product )   Nonspecific ST and T wave abnormality     Echo 8/17/21:  1. Left ventricle: The cavity size is normal. Wall thickness is mildly      increased. Systolic function is normal. The estimated ejection fraction      is 55-60%. Wall motion is normal; there are no regional wall motion      abnormalities. Doppler parameters are consistent with abnormal left      ventricular relaxation (grade 1 diastolic dysfunction).   2. Right ventricle: The cavity size is normal. Wall thickness is normal.      Systolic function is normal. Estimation of the right ventricular systolic      pressure is within the normal range.   3. Aortic valve: Trileaflet; mildly thickened, mildly calcified leaflets.      There is mild to moderate regurgitation.   4. Mitral valve: Mildly calcified annulus.   5. Tricuspid valve: There is mild regurgitation.   6. Pericardium, extracardiac: There is no significant pericardial effusion.     Impression:  89 year old female presenting with DONATO, edema, orthopnea, ~ 3 lb wt gain  Acute HFpEF - may have been exacerbated by HTN, AF RVR  Pers AF   CHADS VASC 4  H/o sinus yaritza  CKD 3  Transaminitis    Recommendations:  Recently  amio started in place of Toprol (was on 100mg nightly). Now with AF RVR, will restart metoprolol and wean off dilt drip.   Continue IV lasix today, probably go to PO tmrw as close to dry wt. Suspect some pulm congestion related to AF RVR and HTN  Continue Eliquis  Update echo  Replete K, follow renal function  Unclear etiology for LFTs- ? Amio effect, Passive congestion. Trend for now.     Thank you for allowing our practice to participate in the care of your patient. Please do not hesitate to contact me if you have any questions.    Jerel Francois MD  Interventional Cardiology  [facility]  Office: [phone]         [1] No Known Allergies

## 2024-12-09 LAB
ALBUMIN SERPL-MCNC: 3.6 G/DL (ref 3.2–4.8)
ALP LIVER SERPL-CCNC: 142 U/L
ALT SERPL-CCNC: 82 U/L
ANION GAP SERPL CALC-SCNC: 9 MMOL/L (ref 0–18)
AST SERPL-CCNC: 53 U/L (ref ?–34)
BILIRUB DIRECT SERPL-MCNC: 0.2 MG/DL (ref ?–0.3)
BILIRUB SERPL-MCNC: 0.8 MG/DL (ref 0.2–1.1)
BUN BLD-MCNC: 35 MG/DL (ref 9–23)
CALCIUM BLD-MCNC: 9.6 MG/DL (ref 8.7–10.4)
CHLORIDE SERPL-SCNC: 109 MMOL/L (ref 98–112)
CO2 SERPL-SCNC: 26 MMOL/L (ref 21–32)
CREAT BLD-MCNC: 1.39 MG/DL
EGFRCR SERPLBLD CKD-EPI 2021: 36 ML/MIN/1.73M2 (ref 60–?)
GLUCOSE BLD-MCNC: 114 MG/DL (ref 70–99)
MAGNESIUM SERPL-MCNC: 1.8 MG/DL (ref 1.6–2.6)
NT-PROBNP SERPL-MCNC: 2096 PG/ML (ref ?–450)
NT-PROBNP SERPL-MCNC: 3566 PG/ML (ref ?–450)
OSMOLALITY SERPL CALC.SUM OF ELEC: 307 MOSM/KG (ref 275–295)
POTASSIUM SERPL-SCNC: 4.4 MMOL/L (ref 3.5–5.1)
POTASSIUM SERPL-SCNC: 4.4 MMOL/L (ref 3.5–5.1)
PROT SERPL-MCNC: 6.4 G/DL (ref 5.7–8.2)
SODIUM SERPL-SCNC: 144 MMOL/L (ref 136–145)

## 2024-12-09 PROCEDURE — 83880 ASSAY OF NATRIURETIC PEPTIDE: CPT | Performed by: INTERNAL MEDICINE

## 2024-12-09 PROCEDURE — 83735 ASSAY OF MAGNESIUM: CPT | Performed by: HOSPITALIST

## 2024-12-09 PROCEDURE — 80048 BASIC METABOLIC PNL TOTAL CA: CPT | Performed by: HOSPITALIST

## 2024-12-09 PROCEDURE — 80076 HEPATIC FUNCTION PANEL: CPT | Performed by: HOSPITALIST

## 2024-12-09 PROCEDURE — 97535 SELF CARE MNGMENT TRAINING: CPT

## 2024-12-09 PROCEDURE — 84132 ASSAY OF SERUM POTASSIUM: CPT | Performed by: HOSPITALIST

## 2024-12-09 PROCEDURE — 97165 OT EVAL LOW COMPLEX 30 MIN: CPT

## 2024-12-09 RX ORDER — METOPROLOL SUCCINATE 100 MG/1
100 TABLET, EXTENDED RELEASE ORAL DAILY
COMMUNITY
Start: 2024-10-18

## 2024-12-09 RX ORDER — CHOLECALCIFEROL (VITAMIN D3) 25 MCG
1000 TABLET ORAL DAILY
COMMUNITY

## 2024-12-09 RX ORDER — FUROSEMIDE 40 MG/1
40 TABLET ORAL DAILY
Qty: 30 TABLET | Refills: 2 | Status: SHIPPED | OUTPATIENT
Start: 2024-12-09 | End: 2025-03-09

## 2024-12-09 RX ORDER — MULTIVIT-MIN/IRON FUM/FOLIC AC 7.5 MG-4
1 TABLET ORAL DAILY
COMMUNITY

## 2024-12-09 RX ORDER — MAGNESIUM OXIDE 400 MG/1
400 TABLET ORAL ONCE
Status: COMPLETED | OUTPATIENT
Start: 2024-12-09 | End: 2024-12-09

## 2024-12-09 NOTE — OCCUPATIONAL THERAPY NOTE
OCCUPATIONAL THERAPY EVALUATION - INPATIENT    Room Number: 2622/2622-A  Evaluation Date: 12/9/2024     Type of Evaluation: Initial  Presenting Problem: A on C cHF    Physician Order: IP Consult to Occupational Therapy  Reason for Therapy:  ADL/IADL Dysfunction and Discharge Planning    OCCUPATIONAL THERAPY ASSESSMENT   Patient is a 89 year old female admitted on 12/7/2024 with Presenting Problem: A on C cHF. Co-Morbidities : PAF on eliquis, CKD3  Patient is currently functioning at baseline with toileting, lower body dressing, bed mobility, and transfers.  Prior to admission, patient's baseline is independent.  Patient met all OT goals at baseline level.  Patient reports no further questions/concerns at this time.     Discharge OT with no additional skilled services but increased support at home    Recommendations for nursing staff:   Transfers: up ad eric  Toileting location: Toilet    EVALUATION SESSION:  Patient at start of session: bed    FUNCTIONAL TRANSFER ASSESSMENT  Sit to Stand: Edge of Bed; Chair  Edge of Bed: Supervision  Chair: Supervision  Toilet Transfer: Supervision    BED MOBILITY  Supine to Sit : Supervision    BALANCE ASSESSMENT     FUNCTIONAL ADL ASSESSMENT  LB Dressing Seated: Supervision  LB Dressing Standing: Supervision  Toileting Standing: Supervision    ACTIVITY TOLERANCE: good                         O2 SATURATIONS       COGNITION  Overall Cognitive Status:  WFL - within functional limits    COGNITION ASSESSMENTS     Upper Extremity:   ROM: within functional limits   Strength: is within functional limits   Coordination:  Gross motor:   Fine motor:   Sensation:     EDUCATION PROVIDED  Patient Education : Plan of Care; Discharge Recommendations; Proper Body Mechanics; Energy Conservation  Patient's Response to Education: Verbalized Understanding    Equipment used: none  Demonstrates functional use    Therapist comments: Pt functioning at baseline, OT will sign off.     Patient End of Session:  In bed;Call light within reach;Needs met    OCCUPATIONAL PROFILE    HOME SITUATION  Type of Home: House  Home Layout: Two level  Lives With: Son (DIl - both work from home)       Shower/Tub and Equipment: Walk-in shower;Shower chair             Drives: No  Patient Regularly Uses: Cane    Prior Level of Function: independent    SUBJECTIVE  Pleasant and cooperative  When do I get to leave?      PAIN ASSESSMENT             OBJECTIVE  Precautions: None  Fall Risk: Standard fall risk    WEIGHT BEARING RESTRICTION       AM-PAC ‘6-Clicks’ Inpatient Daily Activity Short Form  -   Putting on and taking off regular lower body clothing?: A Little  -   Bathing (including washing, rinsing, drying)?: A Little  -   Toileting, which includes using toilet, bedpan or urinal? : None  -   Putting on and taking off regular upper body clothing?: None  -   Taking care of personal grooming such as brushing teeth?: None  -   Eating meals?: None    AM-PAC Score:  Score: 22  Approx Degree of Impairment: 25.8%  Standardized Score (AM-PAC Scale): 47.1    ADDITIONAL TESTS     NEUROLOGICAL FINDINGS      PLAN   Patient has been evaluated and presents with no skilled Occupational Therapy needs at this time.  Patient discharged from Occupational Therapy services.  Please re-order if a new functional limitation presents during this admission.         Patient Evaluation Complexity Level:   Occupational Profile/Medical History LOW - Brief history including review of medical or therapy records    Specific performance deficits impacting engagement in ADL/IADL LOW  1 - 3 performance deficits    Client Assessment/Performance Deficits LOW - No comorbidities nor modifications of tasks    Clinical Decision Making LOW - Analysis of occupational profile, problem-focused assessments, limited treatment options    Overall Complexity LOW     OT Session Time: 25 minutes  Self-Care Home Management: 15 minutes  Therapeutic Activity:  minutes  Neuromuscular  Re-education:  minutes  Therapeutic Exercise:  minutes  Cognitive Skills:  minutes  Sensory Integrative:  minutes  Orthotic Management and Training:  minutes  Can add/delete any of these

## 2024-12-09 NOTE — PLAN OF CARE
Received patient from NOC RN at 0730. Patient A&O x4 and on room air, bilat lung sounds clear to diminished. Patient voiding without complications and is reporting no pain at this time. Patient is resting in bed but ambulates frequently in patient's room. Fall precautions in place, call light and belongings within reach. Plan of care evolving.    POC:   - 40mg IV lasix BID  - diltiazem gtt     Problem: Patient/Family Goals  Goal: Patient/Family Long Term Goal  Description: Patient's Long Term Goal: to go home    Interventions:  - MD rounding, medication management, monitor labs, pain control, monitor breathing, echo, diuretics  - See additional Care Plan goals for specific interventions  Outcome: Progressing  Goal: Patient/Family Short Term Goal  Description: Patient's Short Term Goal: remain pain free    Interventions:   - pain assessments q4, pain meds PRN  - See additional Care Plan goals for specific interventions  Outcome: Progressing     Problem: CARDIOVASCULAR - ADULT  Goal: Maintains optimal cardiac output and hemodynamic stability  Description: INTERVENTIONS:  - Monitor vital signs, rhythm, and trends  - Monitor for bleeding, hypotension and signs of decreased cardiac output  - Evaluate effectiveness of vasoactive medications to optimize hemodynamic stability  - Monitor arterial and/or venous puncture sites for bleeding and/or hematoma  - Assess quality of pulses, skin color and temperature  - Assess for signs of decreased coronary artery perfusion - ex. Angina  - Evaluate fluid balance, assess for edema, trend weights  Outcome: Progressing  Goal: Absence of cardiac arrhythmias or at baseline  Description: INTERVENTIONS:  - Continuous cardiac monitoring, monitor vital signs, obtain 12 lead EKG if indicated  - Evaluate effectiveness of antiarrhythmic and heart rate control medications as ordered  - Initiate emergency measures for life threatening arrhythmias  - Monitor electrolytes and administer replacement  therapy as ordered  Outcome: Progressing     Problem: RESPIRATORY - ADULT  Goal: Achieves optimal ventilation and oxygenation  Description: INTERVENTIONS:  - Assess for changes in respiratory status  - Assess for changes in mentation and behavior  - Position to facilitate oxygenation and minimize respiratory effort  - Oxygen supplementation based on oxygen saturation or ABGs  - Provide Smoking Cessation handout, if applicable  - Encourage broncho-pulmonary hygiene including cough, deep breathe, Incentive Spirometry  - Assess the need for suctioning and perform as needed  - Assess and instruct to report SOB or any respiratory difficulty  - Respiratory Therapy support as indicated  - Manage/alleviate anxiety  - Monitor for signs/symptoms of CO2 retention  Outcome: Progressing

## 2024-12-09 NOTE — PLAN OF CARE
Received patient awake and oriented. On room air, breath sounds diminished. Up to the bathroom frequently r/t Lasix. On tele, A flutter. No c/o pain voiced. Continuing on Cardizem gtt.

## 2024-12-09 NOTE — PROGRESS NOTES
.[facility] Hospitalist note    PCP: Elias Swanson MD    Chief Complaint:  F/u sob    SUBJECTIVE:  Breathing feeling a bit better  Remains on dilt gtt  No chest pain, nausea, vomiting, abdominal pain or cough  Some sob and palpitations still    OBJECTIVE:  Temp:  [97.3 °F (36.3 °C)-97.8 °F (36.6 °C)] 97.3 °F (36.3 °C)  Pulse:  [] 104  Resp:  [18-20] 20  BP: (127-162)/(85-97) 128/86  SpO2:  [99 %-100 %] 99 %    Intake/Output:    Intake/Output Summary (Last 24 hours) at 12/9/2024 0733  Last data filed at 12/8/2024 2338  Gross per 24 hour   Intake 1312.17 ml   Output 1600 ml   Net -287.83 ml       Last 3 Weights   12/09/24 0500 140 lb 3.4 oz (63.6 kg)   12/08/24 0300 142 lb 6.7 oz (64.6 kg)   12/07/24 1615 145 lb 4.5 oz (65.9 kg)   12/07/24 1438 142 lb (64.4 kg)   09/07/21 0835 143 lb (64.9 kg)   08/31/21 1050 143 lb (64.9 kg)       Exam  Gen: No acute distress  Pulm: Lungs clear, normal respiratory effort, no crackles, no wheezing  CV: tachy, irregular  Abd: Abdomen soft, nontender, nondistended,  bowel sounds present  MSK: no significant pitting edema or tenderness of the LE  Skin: no new rashes or lesions  Neuro: A&OX3, no focal deficits    Data Review:         Labs:     Recent Labs   Lab 12/08/24  0705   WBC 6.6   HGB 12.4   MCV 90.8   .0   NE 4.36   LYMABS 1.56       Recent Labs   Lab 12/08/24  0705 12/08/24  1642 12/09/24  0444   *  --  144   K 3.2* 3.5 4.4  4.4     --  109   CO2 30.0  --  26.0   BUN 39*  --  35*   CREATSERUM 1.60*  --  1.39*   CA 9.1  --  9.6   MG 1.9  --  1.8   *  --  114*       Recent Labs   Lab 12/08/24  0705   ALT 97*   AST 67*   ALB 3.6       No results for input(s): \"TROP\", \"CK\", \"PBNP\", \"PCT\" in the last 168 hours.    No results for input(s): \"CRP\", \"CAROLYN\", \"LDH\", \"DDIMER\" in the last 168 hours.    No results for input(s): \"PGLU\" in the last 168 hours.    Meds:   Scheduled Medication:   metoprolol succinate  50 mg Oral Q8H    amiodarone  200 mg Oral Daily     apixaban  5 mg Oral BID    furosemide  40 mg Intravenous BID (Diuretic)     Continuous Infusing Medication:   dilTIAZem 10 mg/hr (12/08/24 2144)     PRN Medication:  acetaminophen    melatonin    polyethylene glycol (PEG 3350)    sennosides    bisacodyl    fleet enema    ondansetron    metoclopramide    labetalol       Microbiology:    No results found for this visit on 12/07/24.    Lab Results   Component Value Date    COVID19 NOT DETECTED 08/27/2021        Assessment/Plan:     89 year old female with PMH sig for PAF on eliquis, CKD3 came from [facility] with dyspnea.      Dyspnea  Acute on chronic HFpEF exacerbation  - cont IV lasix BID--> possible PO later today   - daily weights, I/Os. Net neg almost 2L if accurate, down about 3 lbs  - echo in chart  - cardiology following     PAF with AVR--> improving  - amio  - on dilt gtt, trying to wean off and initiate bb--> may need to go up on BB  - eliquis   - tele      Hypertensive urgency- bp improving  - prn labetalol  - hold enalapril while diuresing--> resume if BP elevated during the day      CKD3, baseline cr 1.4-1.7  Mild hypernatremia--> resolved  - monitor Na and Cr with diuresis.   - avoid nephrotoxins    Elevated LFT  -etiology uncertain  -may be related to amio  -repeat levels today and tomorrow     High Risk Medication Monitoring  -iv diltaizem  -iv lasix    FEN: low salt diet, PT/OT  Proph: SCDs, eliquis  Code status: Full code     Dispo: no discharge    Dawit Waters MD  [facility] Hospitalist  [phone]

## 2024-12-09 NOTE — DISCHARGE INSTRUCTIONS
Going Home Instructions  In this section you will find the tools which will guide you through the first few days after you leave the hospital. Continued use of these tools will help you develop the skills necessary to keep your heart failure under control.     Home Care Instructions Following Heart Failure - the most important things to do every day include:   Weigh yourself and review the “Self-Check Plan” sheet every morning.   Call your cardiologist office if you are in the “Pay Attention-Use Caution” (yellow zone) or “Medical Alert-Warning!” (red zone) as outlined in the Self-Check Plan sheet.  Take your medicines as prescribed.  Limit your sodium (salt) intake.  Know when to call your cardiologist, primary doctor, or nurse.  Know when to seek emergency care.      Things for You to Remember:   1. See your doctor or healthcare provider as written on your discharge instructions.  It is important that you attend this appointment to make sure your symptoms are under control.     2. Your recommended sodium intake is 8623-9819 mg daily.    3.  Weigh yourself every day.    4. Some exercise and activity is important to help keep your heart functioning and strong. Unless instructed not to exercise, you may walk at a slow to moderate pace for 10-15 minutes 2-3 days per week to start. Pace your activity to prevent shortness of breath or fatigue. Stop exercising if you develop chest pain, lightheadedness, or significant shortness of breath.       Call Your Cardiologist If:   You gain 2-3 pounds in one day or 5 pounds in one week.  You have more difficulty breathing.  You are getting more tired with normal activity.  You are more short of breath lying down, or awaken at night short of breath.  You have swelling of your feet or legs.  You urinate less often during the day and more often at night.  You have cramps in your legs.  You have blurred vision or see yellowish-green halos around objects of lights.    Go to the  Emergency Room If:   You have pain or tightness in your chest  You are extremely short of breath  You are coughing up pink-frothy mucus  You are traveling and develop symptoms of worsening heart failure      ** Please follow up with your cardiologist or Advanced Practice Provider as written on your discharge instructions. If you are not provided with an appointment, let your nurse know so you can get an appointment**

## 2024-12-09 NOTE — PROGRESS NOTES
Assumed care of pt around 1100  AxO x4, R/A, up SBA  A flutter on tele, no cardiac symptoms  -HR controlled, cardizem gtt off  Pt denies any pain  Continent of bowel and bladder  Fall precautions in place, pt updated on plan of care

## 2024-12-09 NOTE — PROGRESS NOTES
Heart Failure Nurse  Progress Note    Patient was evaluated by the Heart Failure Nurse  for understanding, verbalization, demonstration, and recall of education related to heart failure, overall adherence to the behaviors necessary to maintain a compensated status, and risk for readmission.     Patient assessment:    Patient is able to verbalize signs/symptoms fluid overload/impending HF exacerbation and who to contact with problems                                          _X__ yes  ___ no      Patient is following a 2000 mg sodium diet                                             _X__ yes  ___ no    If no, barriers to 2000 mg sodium diet:_______________________________________________    Patient informed of 2-Part dietician classes that is free if sign up within 30 days of discharge or $40  __X_ yes  ___ no      Patient is adherent to medication regimen                                              __X_ yes  ___ no    If no, barriers to medication regimen:    Patient has sufficient funds to purchase medication                      _X__ yes  ___ no      Patient has a scale in the home              __X_ yes  ___ no      Patient is adherent to daily weight monitoring                                        _X__ yes   ___ no    If no, barriers to daily weight monitoring:    Symptom Tracker Worksheet reviewed with patient  _X__ yes   ___ no      Patient verbalizes understanding of stoplight/heart failure zones          __X_ yes   ___ no      Patient understands the importance of 7-day follow-up appointment      __X_ yes  ___ no    Appointment Date:          Patient has adequate transportation to attend follow-up appointments    _X__ yes  ___ no    If no, was referral to Social Work made  ___yes  ___ no      Family/Friend present during education: son Jose    Additional consultations required: NA    Per patient, the Heart Failure Call Back team may talk with her son Jose and daughter in law Sherrell.     Aletha  CATHI Garrett(signature)  Extension [phone]

## 2024-12-09 NOTE — PROGRESS NOTES
[facility] Cardiology  Consultation Note      Ursula Cox Patient Status:  Inpatient    1935 MRN [id number]   Location [facility] 2NE-A Attending Angely Brambila MD   Hosp Day # 2 PCP Eilas Swanson MD     Reason for consult: SOB    Events: Stable. Denies CP or SOB.     Primary cardiologist: Juan Pablo David MD     History of Present Illness:  Ursula Cox is a 89 year old female who presented to [facility] on 2024 with DONATO, edema, orthopnea over weeks. No CP. Hypertensive in ER, given labetalol. AF RVR on floor, started on dilt drip and lasix. Currently feeling much better.     Medications:  Current Facility-Administered Medications   Medication Dose Route Frequency    metoprolol succinate ER (Toprol XL) 24 hr tab 50 mg  50 mg Oral Q8H    amiodarone (Pacerone) tab 200 mg  200 mg Oral Daily    apixaban (Eliquis) tab 5 mg  5 mg Oral BID    acetaminophen (Tylenol Extra Strength) tab 500 mg  500 mg Oral Q4H PRN    melatonin tab 3 mg  3 mg Oral Nightly PRN    polyethylene glycol (PEG 3350) (Miralax) 17 g oral packet 17 g  17 g Oral Daily PRN    sennosides (Senokot) tab 17.2 mg  17.2 mg Oral Nightly PRN    bisacodyl (Dulcolax) 10 MG rectal suppository 10 mg  10 mg Rectal Daily PRN    fleet enema (Fleet) rectal enema 133 mL  1 enema Rectal Once PRN    ondansetron (Zofran) 4 MG/2ML injection 4 mg  4 mg Intravenous Q6H PRN    metoclopramide (Reglan) 5 mg/mL injection 10 mg  10 mg Intravenous Q8H PRN    furosemide (Lasix) 10 mg/mL injection 40 mg  40 mg Intravenous BID (Diuretic)    dilTIAZem (cardIZEM) 100 mg in sodium chloride 0.9% 100 mL IVPB-ADDV  2.5-20 mg/hr Intravenous Continuous    labetalol (Trandate) 5 mg/mL injection 10 mg  10 mg Intravenous Q4H PRN       Past Medical History:    Atrial fibrillation (HCC)    EP cards: Dr. David    Chronic renal insufficiency, stage 3 (moderate) (HCC)    History of cervical cancer    stage 1B    Osteoarthritis    left hip    Osteoporosis     rheum: Dr. Sammy Rodriges       Past Surgical History:   Procedure Laterality Date    Colonoscopy      , unremarkable    Remv cataract extracap,insert lens  2012    Procedure: LEFT PHACOEMULSIFICATION OF CATARACT WITH INTRAOCULAR LENS IMPLANT 23159;  Surgeon: Paramjit Chin MD;  Location: [facility], [facility]    Total abdom hysterectomy         Family History  family history is not on file.    Social History   reports that she has never smoked. She has never used smokeless tobacco. She reports that she does not drink alcohol and does not use drugs.     Allergies  Allergies[1]    Review of Systems:  As per HPI, otherwise 10 point ROS is negative in detail.    Physical Exam:  Blood pressure 146/90, pulse 72, temperature 97.5 °F (36.4 °C), temperature source Oral, resp. rate 18, height 67\", weight 140 lb 3.4 oz (63.6 kg), SpO2 99%, not currently breastfeeding.  Temp (24hrs), Av.5 °F (36.4 °C), Min:97.3 °F (36.3 °C), Max:97.8 °F (36.6 °C)    Wt Readings from Last 3 Encounters:   24 140 lb 3.4 oz (63.6 kg)   21 143 lb (64.9 kg)   21 143 lb (64.9 kg)       General: Awake and alert; in no acute distress  HEENT: Extraocular movements are intact; sclerae are anicteric; scalp is atraumatic  Neck: Supple; no JVD; no carotid bruits  Cardiac: Regular rate and regular rhythm; normal S1 and S2, no murmurs, rubs, or gallops are appreciated  Lungs: Clear to auscultation bilaterally; no accessory muscle use is noted, no wheezes, rhonci or rales  Abdomen: Soft, non-distended, non-tender; bowel sounds are normoactive  Extremities: Warm, no edema, clubbing or cyanosis; moves all 4 extremities normally, distal pulses intact and equal  Psychiatric: Normal mood and affect; answers questions appropriately  Dermatologic: No rashes; normal skin turgor    Diagnostic testing:    Labs:   Lab Results   Component Value Date    INR 1.0 (L) 2008        Lab Results   Component Value Date    CREATSERUM 1.39  12/09/2024    BUN 35 12/09/2024     12/09/2024    K 4.4 12/09/2024    K 4.4 12/09/2024     12/09/2024    CO2 26.0 12/09/2024     12/09/2024    CA 9.6 12/09/2024    ALB 3.6 12/09/2024    ALKPHO 142 12/09/2024    BILT 0.8 12/09/2024    TP 6.4 12/09/2024    AST 53 12/09/2024    ALT 82 12/09/2024    MG 1.8 12/09/2024       Cardiac diagnostics:    Echo 12/8/24  1. Left ventricle: The cavity size was normal. Wall thickness was normal.      Systolic function was mildly reduced. The estimated ejection fraction was      40-45%. There was mild diffuse hypokinesis. Unable to assess LV diastolic      function due to heart rhythm.   2. Left atrium: The atrium was moderately to markedly dilated.   3. Right atrium: The atrium was dilated.   4. Aortic valve: There was mild regurgitation.   5. Mitral valve: There was mild regurgitation.   6. Tricuspid valve: There was moderate-severe regurgitation.   7. Pulmonary arteries: Systolic pressure was mildly to moderately increased,      estimated to be 47mm Hg.     CXR 12/7/2024   CONCLUSION:  Cardiomegaly.  Mild pulmonary venous congestion.  Mild prominence of the interstitial markings in the mid-lungs and bases concerning for pulmonary edema.    Bibasilar atelectasis.    Blunting of the right costophrenic angle.     EKG 12/8/2024:   Atrial fibrillation   Left axis deviation   Moderate voltage criteria for LVH, may be normal variant ( R in aVL , Noman product )   Nonspecific ST and T wave abnormality     Echo 8/17/21:  1. Left ventricle: The cavity size is normal. Wall thickness is mildly      increased. Systolic function is normal. The estimated ejection fraction      is 55-60%. Wall motion is normal; there are no regional wall motion      abnormalities. Doppler parameters are consistent with abnormal left      ventricular relaxation (grade 1 diastolic dysfunction).   2. Right ventricle: The cavity size is normal. Wall thickness is normal.      Systolic function is  normal. Estimation of the right ventricular systolic      pressure is within the normal range.   3. Aortic valve: Trileaflet; mildly thickened, mildly calcified leaflets.      There is mild to moderate regurgitation.   4. Mitral valve: Mildly calcified annulus.   5. Tricuspid valve: There is mild regurgitation.   6. Pericardium, extracardiac: There is no significant pericardial effusion.     Impression:  89 year old female presenting with DONATO, edema, orthopnea, ~ 3 lb wt gain  Acute HF - may have been exacerbated by HTN, AF RVR  Cardiomyopathy - mild, new LVEF 45% echo 12/8/24. Suspect tachy mediated.   Pers AF   CHADS VASC 4  H/o sinus yaritza  CKD 3  Transaminitis    Recommendations:  Recently amio started in place of Toprol (was on 100mg nightly). Now with AF RVR, restarted metoprolol and weaned off dilt drip.   S/p IV lasix, wt down to 140 lbs. Appears compensated now, change to PO. Suspect pulm congestion related to AF RVR and HTN.   Surveillance echo as outpatient, if EF does not improve with HR control, will need to do ischemic evaluation.   Continue Eliquis  Replete K, follow renal function  Unclear etiology for LFTs- ? Amio effect, Passive congestion. Trending down with diuresis.   OK for dc from my standpoint. F/u 1 week office.     Thank you for allowing our practice to participate in the care of your patient. Please do not hesitate to contact me if you have any questions.    Jerel Francois MD  Interventional Cardiology  [facility]  Office: [phone]       [1] No Known Allergies

## 2024-12-10 LAB
ALBUMIN SERPL-MCNC: 3.7 G/DL (ref 3.2–4.8)
ALBUMIN/GLOB SERPL: 1.5 {RATIO} (ref 1–2)
ALP LIVER SERPL-CCNC: 143 U/L
ALT SERPL-CCNC: 65 U/L
ANION GAP SERPL CALC-SCNC: 4 MMOL/L (ref 0–18)
AST SERPL-CCNC: 41 U/L (ref ?–34)
BASOPHILS # BLD AUTO: 0.05 X10(3) UL (ref 0–0.2)
BASOPHILS NFR BLD AUTO: 0.7 %
BILIRUB SERPL-MCNC: 0.8 MG/DL (ref 0.2–1.1)
BUN BLD-MCNC: 30 MG/DL (ref 9–23)
CALCIUM BLD-MCNC: 9.1 MG/DL (ref 8.7–10.4)
CHLORIDE SERPL-SCNC: 107 MMOL/L (ref 98–112)
CO2 SERPL-SCNC: 32 MMOL/L (ref 21–32)
CREAT BLD-MCNC: 1.54 MG/DL
EGFRCR SERPLBLD CKD-EPI 2021: 32 ML/MIN/1.73M2 (ref 60–?)
EOSINOPHIL # BLD AUTO: 0.13 X10(3) UL (ref 0–0.7)
EOSINOPHIL NFR BLD AUTO: 1.9 %
ERYTHROCYTE [DISTWIDTH] IN BLOOD BY AUTOMATED COUNT: 14 %
GLOBULIN PLAS-MCNC: 2.5 G/DL (ref 2–3.5)
GLUCOSE BLD-MCNC: 107 MG/DL (ref 70–99)
HCT VFR BLD AUTO: 41.7 %
HGB BLD-MCNC: 13.3 G/DL
IMM GRANULOCYTES # BLD AUTO: 0.02 X10(3) UL (ref 0–1)
IMM GRANULOCYTES NFR BLD: 0.3 %
LYMPHOCYTES # BLD AUTO: 1.91 X10(3) UL (ref 1–4)
LYMPHOCYTES NFR BLD AUTO: 28.5 %
MAGNESIUM SERPL-MCNC: 1.9 MG/DL (ref 1.6–2.6)
MCH RBC QN AUTO: 29.6 PG (ref 26–34)
MCHC RBC AUTO-ENTMCNC: 31.9 G/DL (ref 31–37)
MCV RBC AUTO: 92.7 FL
MONOCYTES # BLD AUTO: 0.52 X10(3) UL (ref 0.1–1)
MONOCYTES NFR BLD AUTO: 7.8 %
NEUTROPHILS # BLD AUTO: 4.07 X10 (3) UL (ref 1.5–7.7)
NEUTROPHILS # BLD AUTO: 4.07 X10(3) UL (ref 1.5–7.7)
NEUTROPHILS NFR BLD AUTO: 60.8 %
OSMOLALITY SERPL CALC.SUM OF ELEC: 303 MOSM/KG (ref 275–295)
PLATELET # BLD AUTO: 272 10(3)UL (ref 150–450)
POTASSIUM SERPL-SCNC: 4.2 MMOL/L (ref 3.5–5.1)
PROT SERPL-MCNC: 6.2 G/DL (ref 5.7–8.2)
RBC # BLD AUTO: 4.5 X10(6)UL
SODIUM SERPL-SCNC: 143 MMOL/L (ref 136–145)
WBC # BLD AUTO: 6.7 X10(3) UL (ref 4–11)

## 2024-12-10 PROCEDURE — 80053 COMPREHEN METABOLIC PANEL: CPT | Performed by: HOSPITALIST

## 2024-12-10 PROCEDURE — 83735 ASSAY OF MAGNESIUM: CPT | Performed by: HOSPITALIST

## 2024-12-10 PROCEDURE — 85025 COMPLETE CBC W/AUTO DIFF WBC: CPT | Performed by: HOSPITALIST

## 2024-12-10 RX ORDER — FUROSEMIDE 40 MG/1
40 TABLET ORAL DAILY
Status: DISCONTINUED | OUTPATIENT
Start: 2024-12-10 | End: 2024-12-10

## 2024-12-10 NOTE — PLAN OF CARE
Assumed care of patient at 1930.   Pt is Aox4, resting comfortably in bed with son at the bedside.   Patient is on room air.  Complains of no difficulty breathing.  Lung sounds clear, diminished. Pt required 2L NC o2 overnight to keep o2 sat >90%.   Pt is afib/aflutter on tele, heart rates at rest are 90s-110s and with ambulation 120s-130s.   Pt is on cardiac diet.    Pt is continent of bowel and bladder, hat placed in toilet for accurate I&Os.   Complains of no pain.   Pt is ambulating standby assist.   Skin in unremarkable.   Bed at lowest position, room cleared of clutter, bed alarm is on, and call light is within reach.   POC discussed, fall precautions reviewed, and questions answered.      Problem: Patient/Family Goals  Goal: Patient/Family Long Term Goal  Description: Patient's Long Term Goal: to go home    Interventions:  - MD rounding, medication management, monitor labs, pain control, monitor breathing, echo, diuretics  - See additional Care Plan goals for specific interventions  Outcome: Progressing  Goal: Patient/Family Short Term Goal  Description: Patient's Short Term Goal: remain pain free    Interventions:   - pain assessments q4, pain meds PRN  - See additional Care Plan goals for specific interventions  Outcome: Progressing     Problem: CARDIOVASCULAR - ADULT  Goal: Maintains optimal cardiac output and hemodynamic stability  Description: INTERVENTIONS:  - Monitor vital signs, rhythm, and trends  - Monitor for bleeding, hypotension and signs of decreased cardiac output  - Evaluate effectiveness of vasoactive medications to optimize hemodynamic stability  - Monitor arterial and/or venous puncture sites for bleeding and/or hematoma  - Assess quality of pulses, skin color and temperature  - Assess for signs of decreased coronary artery perfusion - ex. Angina  - Evaluate fluid balance, assess for edema, trend weights  Outcome: Progressing  Goal: Absence of cardiac arrhythmias or at  baseline  Description: INTERVENTIONS:  - Continuous cardiac monitoring, monitor vital signs, obtain 12 lead EKG if indicated  - Evaluate effectiveness of antiarrhythmic and heart rate control medications as ordered  - Initiate emergency measures for life threatening arrhythmias  - Monitor electrolytes and administer replacement therapy as ordered  Outcome: Progressing     Problem: RESPIRATORY - ADULT  Goal: Achieves optimal ventilation and oxygenation  Description: INTERVENTIONS:  - Assess for changes in respiratory status  - Assess for changes in mentation and behavior  - Position to facilitate oxygenation and minimize respiratory effort  - Oxygen supplementation based on oxygen saturation or ABGs  - Provide Smoking Cessation handout, if applicable  - Encourage broncho-pulmonary hygiene including cough, deep breathe, Incentive Spirometry  - Assess the need for suctioning and perform as needed  - Assess and instruct to report SOB or any respiratory difficulty  - Respiratory Therapy support as indicated  - Manage/alleviate anxiety  - Monitor for signs/symptoms of CO2 retention  Outcome: Progressing

## 2024-12-10 NOTE — PLAN OF CARE
Assumed care of pt around 0730  AxO x4, R/A, up SBA  A flutter on tele, no cardiac symptoms  Pt denies any pain  Continent of bowel and bladder  Fall precautions in place, pt updated on plan of care           Problem: Patient/Family Goals  Goal: Patient/Family Long Term Goal  Description: Patient's Long Term Goal: to go home    Interventions:  - MD rounding, medication management, monitor labs, pain control, monitor breathing, echo, diuretics  - See additional Care Plan goals for specific interventions  Outcome: Progressing  Goal: Patient/Family Short Term Goal  Description: Patient's Short Term Goal: remain pain free    Interventions:   - pain assessments q4, pain meds PRN  - See additional Care Plan goals for specific interventions  Outcome: Progressing     Problem: CARDIOVASCULAR - ADULT  Goal: Maintains optimal cardiac output and hemodynamic stability  Description: INTERVENTIONS:  - Monitor vital signs, rhythm, and trends  - Monitor for bleeding, hypotension and signs of decreased cardiac output  - Evaluate effectiveness of vasoactive medications to optimize hemodynamic stability  - Monitor arterial and/or venous puncture sites for bleeding and/or hematoma  - Assess quality of pulses, skin color and temperature  - Assess for signs of decreased coronary artery perfusion - ex. Angina  - Evaluate fluid balance, assess for edema, trend weights  Outcome: Progressing  Goal: Absence of cardiac arrhythmias or at baseline  Description: INTERVENTIONS:  - Continuous cardiac monitoring, monitor vital signs, obtain 12 lead EKG if indicated  - Evaluate effectiveness of antiarrhythmic and heart rate control medications as ordered  - Initiate emergency measures for life threatening arrhythmias  - Monitor electrolytes and administer replacement therapy as ordered  Outcome: Progressing     Problem: RESPIRATORY - ADULT  Goal: Achieves optimal ventilation and oxygenation  Description: INTERVENTIONS:  - Assess for changes in  respiratory status  - Assess for changes in mentation and behavior  - Position to facilitate oxygenation and minimize respiratory effort  - Oxygen supplementation based on oxygen saturation or ABGs  - Provide Smoking Cessation handout, if applicable  - Encourage broncho-pulmonary hygiene including cough, deep breathe, Incentive Spirometry  - Assess the need for suctioning and perform as needed  - Assess and instruct to report SOB or any respiratory difficulty  - Respiratory Therapy support as indicated  - Manage/alleviate anxiety  - Monitor for signs/symptoms of CO2 retention  Outcome: Progressing

## 2024-12-10 NOTE — PAYOR COMM NOTE
--------------  ADMISSION REVIEW     Payor: [employer]  Subscriber #:  [id number]  Authorization Number: [id number]    Admit date: 12/7/24  Admit time:  3:54 PM       History   HPI  Patient comes to the emergency department with 2 to 3 weeks of increasing shortness of breath, particular with exertion and when lying flat.  The patient has had no chest pain.  She does have a previous history of atrial fibrillation which was well-controlled in recent months.  Patient went to [facility] immediate care today and was noted to have a slightly elevated troponin along with a significantly elevated beta natruretic peptide.  She has had no syncope or near syncope.  She has had no weakness.  She has noted some edema in both her lower extremities which has been present over the past week.    ED Triage Vitals   BP 12/07/24 1440 (!) 181/122   Pulse 12/07/24 1438 103   Resp 12/07/24 1438 22   Temp 12/07/24 1438 97.3 °F (36.3 °C)   Temp src 12/07/24 1438 Temporal   SpO2 12/07/24 1438 97 %   O2 Device 12/07/24 1438 None (Room air)     HENT:      Head: Normocephalic.   Cardiovascular:      Rate and Rhythm: Tachycardia present. Rhythm irregular.      Heart sounds: Normal heart sounds. No murmur heard.  Pulmonary:      Effort: Pulmonary effort is normal. No respiratory distress.      Breath sounds: Normal breath sounds.   Abdominal:      General: Bowel sounds are normal.      Palpations: Abdomen is soft.      Tenderness: There is no abdominal tenderness. There is no rebound.   Musculoskeletal:         General: No tenderness. Normal range of motion.      Cervical back: Normal range of motion and neck supple.      Right lower leg: Edema present.      Left lower leg: Edema present.      Comments: Bilateral symmetric lower extremity edema   Lymphadenopathy:      Cervical: No cervical adenopathy.   Skin:     General: Skin is warm and dry.      Findings: No rash.     EKG    Rate, intervals and axes as noted on EKG Report.  Rate:  100  Rhythm: Atrial Fibrillation  Reading: No obvious acute ischemic ST or T wave abnormalities  Admission disposition: 12/7/2024  3:10 PM    Disposition and Plan     Clinical Impression:  1. Acute congestive heart failure, unspecified heart failure type (HCC)    2. Resistant hypertension       Disposition:  Admit  12/7/2024  3:10 pm         [facility] History and Physical     History of Present Illness: Patient is a 89 year old female with PMH sig for PAF on eliquis, CKD3 came from [facility] ER with dyspnea. Symptoms ongoing for a few weeks. Worsens on exertion and when lying flat. Was seen by [facility] a few days ago for congestion and started on amoxicillin with no improvement. In [facility] labs were drawn with BNP of 8K. Mild troponin elevation. Sent to [facility] for further evaluation and treatment. In the ED she was hypertensive and HR in 90s. CXR not done. Given a dose of IV lasix and labetalol, cardiology consulted and will be admitted for further evaluation and treatment.      BP (!) 155/94   Pulse 104   Temp 97.3 °F (36.3 °C) (Temporal)   Resp 24   Ht 5' 7\" (1.702 m)   Wt 142 lb (64.4 kg)   SpO2 97%   BMI 22.24 kg/m²   General:  Alert, appears stated age.    Head:  Normocephalic, without obvious abnormality, atraumatic.   Eyes:  Sclera anicteric, No conjunctival pallor, EOMs intact.    Nose: Nares normal. Septum midline. Mucosa normal. No drainage.   Throat: Lips, mucosa, and tongue normal. Teeth and gums normal.   Neck: Supple, symmetrical, trachea midline, no cervical or supraclavicular lymph adenopathy, thyroid: no enlargment/tenderness/nodules appreciated   Lungs:   Mild bibasilar crackles. Normal effort   Chest wall:  No tenderness or deformity.   Heart:  Regular rate and rhythm, S1, S2 normal, no murmur, rub or gallop appreciated   Abdomen:   Soft, non-tender. Bowel sounds normal. No masses,  No organomegaly. Non distended   Extremities: Extremities normal, atraumatic, no cyanosis, trace BLE edema.    Skin: Skin color, texture, turgor normal. No rashes or lesions.    Neurologic: Normal strength, no focal deficit appreciated      Assessment/Plan:      89 year old female with PMH sig for PAF on eliquis, CKD3 came from [facility] with dyspnea.      Dyspnea  Acute on chronic HFpEF exacerbation  - cont IV lasix BID  - daily weights, I/Os  - update echo  - cardiology consulted     PAF with AVR  - amio  - will start diltiazem infusion   - eliquis   - tele      Hypertensive urgency  - prn labetalol  - hold enalapril while diuresing     CKD3  - monitor with diuresis  - avoid nephrotoxins     FEN: low salt diet, PT/OT  Proph: SCDs, eliquis               12/8/24     SUBJECTIVE:  Breathing feeling a bit better  Remains on dilt gtt     Temp:  [97.3 °F (36.3 °C)-97.7 °F (36.5 °C)] 97.6 °F (36.4 °C)  Pulse:  [] 103  Resp:  [12-28] 20  BP: (111-184)/() 159/97  SpO2:  [97 %-100 %] 97 %     HEENT: anicteric sclera, MMM  Pulm: Lungs clear, normal respiratory effort  CV: tachy, irregular  Abd: Abdomen soft, nontender, nondistended, no organomegaly, bowel sounds present  MSK: modest edema in LE b/l taylor by ankles/feet  Skin: no rashes or lesions  Neuro: A&OX3, no focal deficits     Lab 12/08/24  0705   WBC 6.6   HGB 12.4   MCV 90.8   .0   NE 4.36   LYMABS 1.56      Lab 12/08/24  0705   *   K 3.2*      CO2 30.0   BUN 39*   CREATSERUM 1.60*   CA 9.1   MG 1.9   *      Lab 12/08/24  0705   ALT 97*   AST 67*   ALB 3.6      Meds:   Scheduled Medication:   potassium chloride  40 mEq Oral Q4H    amiodarone  200 mg Oral Daily    apixaban  5 mg Oral BID    furosemide  40 mg Intravenous BID (Diuretic)      Continuous Infusing Medication:   dilTIAZem 5 mg/hr (12/07/24 1940)      Assessment/Plan:   89 year old female with PMH sig for PAF on eliquis, CKD3 came from [facility] with dyspnea.      Dyspnea  Acute on chronic HFpEF exacerbation  - cont IV lasix BID  - daily weights, I/Os. Net neg almost 2L if accurate,  down about 3 lbs  - echo ordered, pending  - cardiology consulted     PAF with AVR  - amio  - on dilt gtt, trying to wean off and initiate bb  - eliquis   - tele      Hypertensive urgency- bp improving  - prn labetalol  - hold enalapril while diuresing     CKD3, baseline cr 1.4-1.7  Mild hypernatremia  - monitor Na and Cr with diuresis.   - avoid nephrotoxins     FEN: low salt diet, PT/OT  Proph: SCDs, eliquis                    12/9/24        SUBJECTIVE:  Breathing feeling a bit better  Remains on dilt gtt  Some sob and palpitations still     Temp:  [97.3 °F (36.3 °C)-97.8 °F (36.6 °C)] 97.3 °F (36.3 °C)  Pulse:  [] 104  Resp:  [18-20] 20  BP: (127-162)/(85-97) 128/86  SpO2:  [99 %-100 %] 99 %     Pulm: Lungs clear, normal respiratory effort, no crackles, no wheezing  CV: tachy, irregular  Abd: Abdomen soft, nontender, nondistended,  bowel sounds present  MSK: no significant pitting edema or tenderness of the LE  Skin: no new rashes or lesions  Neuro: A&OX3, no focal deficits     Lab 12/08/24  0705   WBC 6.6   HGB 12.4   MCV 90.8   .0   NE 4.36   LYMABS 1.56      Lab 12/08/24  0705 12/08/24  1642 12/09/24  0444   *  --  144   K 3.2* 3.5 4.4  4.4     --  109   CO2 30.0  --  26.0   BUN 39*  --  35*   CREATSERUM 1.60*  --  1.39*   CA 9.1  --  9.6   MG 1.9  --  1.8   *  --  114*       Meds:   Scheduled Medication:   metoprolol succinate  50 mg Oral Q8H    amiodarone  200 mg Oral Daily    apixaban  5 mg Oral BID    furosemide  40 mg Intravenous BID (Diuretic)      Continuous Infusing Medication:   dilTIAZem 10 mg/hr (12/08/24 9476)       Assessment/Plan:   89 year old female with PMH sig for PAF on eliquis, CKD3 came from [facility] with dyspnea.      Dyspnea  Acute on chronic HFpEF exacerbation  - cont IV lasix BID--> possible PO later today   - daily weights, I/Os. Net neg almost 2L if accurate, down about 3 lbs  - echo in chart  - cardiology following     PAF with AVR--> improving  -  amio  - on dilt gtt, trying to wean off and initiate bb--> may need to go up on BB  - eliquis   - tele      Hypertensive urgency- bp improving  - prn labetalol  - hold enalapril while diuresing--> resume if BP elevated during the day      CKD3, baseline cr 1.4-1.7  Mild hypernatremia--> resolved  - monitor Na and Cr with diuresis.   - avoid nephrotoxins     Elevated LFT  -etiology uncertain  -may be related to amio  -repeat levels today and tomorrow     High Risk Medication Monitoring  -iv diltaizem  -iv lasix     FEN: low salt diet, PT/OT  Proph: SCDs, eliquis                    MEDICATIONS ADMINISTERED IN LAST 1 DAY:  amiodarone (Pacerone) tab 200 mg       Date Action Dose Route User    12/10/2024 0838 Given 200 mg Oral Paulette Garcia RN          apixaban (Eliquis) tab 2.5 mg       Date Action Dose Route User    12/10/2024 0838 Given 2.5 mg Oral Paulette Garcia RN          apixaban (Eliquis) tab 5 mg       Date Action Dose Route User    12/9/2024 2136 Given 5 mg Oral Tita Santiago RN          furosemide (Lasix) tab 40 mg       Date Action Dose Route User    12/10/2024 0838 Given 40 mg Oral Paulette Garcia RN          metoprolol succinate ER (Toprol XL) 24 hr tab 50 mg       Date Action Dose Route User    12/10/2024 0528 Given 50 mg Oral Tita Santiago RN    12/9/2024 2136 Given 50 mg Oral Tita Santiago RN    12/9/2024 1400 Given 50 mg Oral Paulette Garcia RN            Vitals (last day)       Date/Time Temp Pulse Resp BP SpO2 Weight O2 Device O2 Flow Rate (L/min) Who    12/10/24 0815 97.9 °F (36.6 °C) 109 20 135/91 99 % -- None (Room air) -- LS    12/10/24 0516 97.9 °F (36.6 °C) 97 17 131/91 99 % 138 lb 14.2 oz (63 kg) None (Room air) 0 L/min AO    12/10/24 0456 -- -- -- -- 93 % -- -- -- MF    12/10/24 0455 -- -- -- -- 89 % -- -- --     12/10/24 0454 -- -- -- -- 85 % -- -- --     12/10/24 0453 -- -- -- -- 99 % -- -- --     12/10/24 0452 -- -- -- -- 93 % -- -- --     12/10/24 0451 -- -- -- -- 87 % -- -- --      12/10/24 0450 -- -- -- -- 79 % -- -- --     12/10/24 0300 -- 102 -- -- 99 % -- Nasal cannula 2 L/min     12/09/24 2353 97.5 °F (36.4 °C) 105 17 136/84 90 % -- None (Room air) 0 L/min AO    12/09/24 1914 97.5 °F (36.4 °C) 113 18 139/89 -- -- None (Room air) 0 L/min AO    12/09/24 1726 -- 111 -- 153/109 -- -- -- --     12/09/24 1624 -- 118 -- 153/100 -- -- -- --     12/09/24 1624 97.9 °F (36.6 °C) -- 18 -- 99 % -- None (Room air) 0 L/min BP    12/09/24 1224 97.6 °F (36.4 °C) 83 16 157/96 -- -- None (Room air) 0 L/min DS    12/09/24 0822 97.5 °F (36.4 °C) 72 18 146/90 -- -- None (Room air) --     12/09/24 0500 97.3 °F (36.3 °C) 104 20 128/86 99 % 140 lb 3.4 oz (63.6 kg) None (Room air) 0 L/min AO

## 2024-12-10 NOTE — PROGRESS NOTES
NURSING DISCHARGE NOTE    Discharged Home via Wheelchair.  Accompanied by RN  Belongings Taken by patient/family.      Pt cleared by all services to discharge. Discharge instructions given to and understood by patient/son. Appointments reviewed. Pt left unit with all belongings, taken to [place] by RN for discharge.

## 2024-12-10 NOTE — PROGRESS NOTES
.[facility] Hospitalist note    PCP: Elias Swanson MD    Chief Complaint:  F/u sob    SUBJECTIVE:  Breathing feeling better  No chest pain, nausea, vomiting, abdominal pain or cough  Sob and palpitations resolved    OBJECTIVE:  Temp:  [97.5 °F (36.4 °C)-97.9 °F (36.6 °C)] 97.9 °F (36.6 °C)  Pulse:  [] 97  Resp:  [16-18] 17  BP: (131-157)/() 131/91  SpO2:  [90 %-99 %] 99 %    Intake/Output:    Intake/Output Summary (Last 24 hours) at 12/10/2024 0640  Last data filed at 12/9/2024 2100  Gross per 24 hour   Intake 611.54 ml   Output 550 ml   Net 61.54 ml       Last 3 Weights   12/10/24 0516 138 lb 14.2 oz (63 kg)   12/09/24 0500 140 lb 3.4 oz (63.6 kg)   12/08/24 0300 142 lb 6.7 oz (64.6 kg)   12/07/24 1615 145 lb 4.5 oz (65.9 kg)   12/07/24 1438 142 lb (64.4 kg)   09/07/21 0835 143 lb (64.9 kg)   08/31/21 1050 143 lb (64.9 kg)       Exam  Gen: No acute distress  Pulm: Lungs clear, normal respiratory effort, no crackles, no wheezing  CV: tachy, irregular  Abd: Abdomen soft, nontender, nondistended,  bowel sounds present  MSK: no significant pitting edema or tenderness of the LE  Skin: no new rashes or lesions    Data Review:         Labs:     Recent Labs   Lab 12/08/24  0705 12/10/24  0557   WBC 6.6 6.7   HGB 12.4 13.3   MCV 90.8 92.7   .0 272.0   NE 4.36 4.07   LYMABS 1.56 1.91       Recent Labs   Lab 12/08/24  0705 12/08/24  1642 12/09/24  0444   *  --  144   K 3.2* 3.5 4.4  4.4     --  109   CO2 30.0  --  26.0   BUN 39*  --  35*   CREATSERUM 1.60*  --  1.39*   CA 9.1  --  9.6   MG 1.9  --  1.8   *  --  114*       Recent Labs   Lab 12/08/24 0705 12/09/24 0444   ALT 97* 82*   AST 67* 53*   ALB 3.6 3.6       Recent Labs   Lab 12/09/24 0444   PBNP 2,096*       No results for input(s): \"CRP\", \"CAROLYN\", \"LDH\", \"DDIMER\" in the last 168 hours.    No results for input(s): \"PGLU\" in the last 168 hours.    Meds:   Scheduled Medication:   metoprolol succinate  50 mg Oral Q8H    amiodarone  200 mg  Oral Daily    apixaban  5 mg Oral BID    furosemide  40 mg Intravenous BID (Diuretic)     Continuous Infusing Medication:   dilTIAZem Stopped (12/09/24 1052)     PRN Medication:  acetaminophen    melatonin    polyethylene glycol (PEG 3350)    sennosides    bisacodyl    fleet enema    ondansetron    metoclopramide    labetalol       Microbiology:    No results found for this visit on 12/07/24.    Lab Results   Component Value Date    COVID19 NOT DETECTED 08/27/2021        Assessment/Plan:     89 year old female with PMH sig for PAF on eliquis, CKD3 came from [facility] with dyspnea.      Dyspnea  Acute on chronic HFpEF exacerbation  - cont IV lasix BID--> possible PO later today   - daily weights, I/Os. Net neg almost 2L if accurate, down about 3 lbs  - echo in chart  - cardiology following     PAF with AVR--> improving  - amio  - on dilt gtt, trying to wean off and initiate bb--> may need to go up on BB  - eliquis   - tele      Hypertensive urgency- bp improving  - prn labetalol  - metoprolol      CKD3, baseline cr 1.4-1.7  Mild hypernatremia--> resolved  - monitor Na and Cr with diuresis.   - avoid nephrotoxins    Elevated LFT  -etiology uncertain  -may be related to amio  -repeat levels today       FEN: low salt diet, PT/OT  Proph: SCDs, eliquis  Code status: Full code     Dispo: possible discharge today     Dawit Waters MD  [facility] Hospitalist  [phone]

## 2024-12-11 ENCOUNTER — PATIENT OUTREACH (OUTPATIENT)
Dept: CASE MANAGEMENT | Age: 89
End: 2024-12-11

## 2024-12-11 NOTE — PROGRESS NOTES
Hospital follow up.    Mykel David MD  Cardiac Electrophysiology, Cardiology  Jerel Francois MD  Cardiology  [address]  [address]  [place] [address]  [phone]    Elias Swanson MD  Internal Medicine  [address]   [address]  [place] [address]  [phone]  Friday 12/13 @11:30  Existing appointment.    Attempt #1:  Left message on voicemail for patient to call transitions specialist back to schedule follow up appointments. Provided Transitions specialist scheduling phone number [phone].

## 2024-12-11 NOTE — CDS QUERY
Dear Dr. Waters,    Clinical information includes a diagnosis of CONGESTIVE HEART FAILURE (CHF). Based on your judgement and review of the clinical findings, can you please specify further the diagnosis treated?    [   ] Acute systolic CHF  [   ] Acute combined CHF   [  x ] Acute on chronic diastolic CHF   [   ] Other (Please specify):       CLINICAL INFORMATION FROM THE MEDICAL RECORD    Risk Factors:  Afib with RVR  PMH of diastolic CHF with historical EF 55-60% and grade I diastolic dysfunction    Clinical Indicators:   Pt to ED with SOB x 2-3 weeks  2D echo with EF 40-45% and mild diffuse hypokinesis, unable to assess LV diastolic function due to heart rhythm  Pro-BNP 3,556 on admission  Non-pitting BLE edema present  CXR significant for pulmonary edema  Cardiology Note (12/10/24): Acute HF - may have been exacerbated by HTN, AF RVR. Cardiomyopathy - mild, new LVEF 45% echo  Hospitalist Assessment (12/10/24): Acute on chronic HFpEF exacerbation    Treatment:   Cardiology consult   Lasix, Metoprolol  Monitor volume status   Repeat 2D echo  Cardiac telemetry    Use of terms such as suspected, possible, or probable (associated with a specific diagnosis that is being evaluated, monitored, or treated as if it exists) are acceptable and can be coded in the inpatient setting, when documented at the time of discharge.    For questions regarding this query, please contact Clinical : Mira Lares RN at #[phone].    THIS FORM IS A PERMANENT PART OF THE MEDICAL RECORD

## 2024-12-11 NOTE — PROGRESS NOTES
Hospital follow up.  Returning patient's voice message.    Mykel David MD  Cardiac Electrophysiology, Cardiology  Jerel Francois MD  Cardiology  [address]   [address]  [place] [address]  [phone]    Elias Swanson MD  Internal Medicine  [address] [facility] [address] [facility]   [address]  [place] [address]  [phone]  Friday 12/13 @11:30  Existing appointment.    No answer; left a voice message with callback information.

## 2024-12-11 NOTE — PROGRESS NOTES
[facility] Cardiology  Consultation Note      Ursula Cox Patient Status:  Inpatient    1935 MRN [id number]   Location [facility] 2NE-A Attending Angely Brambila MD   Hosp Day # 3 PCP Elias Swanson MD     Reason for consult: SOB    Events: Stable. Denies CP or SOB.     Primary cardiologist: Juan Pablo David MD     History of Present Illness:  Ursula Cox is a 89 year old female who presented to [facility] on 2024 with DONATO, edema, orthopnea over weeks. No CP. Hypertensive in ER, given labetalol. AF RVR on floor, started on dilt drip and lasix. Currently feeling much better.     Medications:  No current facility-administered medications for this encounter.       Past Medical History:    Atrial fibrillation (HCC)    EP cards: Dr. David    Chronic renal insufficiency, stage 3 (moderate) (HCC)    History of cervical cancer    stage 1B    Osteoarthritis    left hip    Osteoporosis    rheum: Dr. Sammy Rodriges       Past Surgical History:   Procedure Laterality Date    Colonoscopy      , unremarkable    Remv cataract extracap,insert lens  2012    Procedure: LEFT PHACOEMULSIFICATION OF CATARACT WITH INTRAOCULAR LENS IMPLANT 30353;  Surgeon: Paramjit Chin MD;  Location: [facility]    Total abdom hysterectomy         Family History  family history is not on file.    Social History   reports that she has never smoked. She has never used smokeless tobacco. She reports that she does not drink alcohol and does not use drugs.     Allergies  Allergies[1]    Review of Systems:  As per HPI, otherwise 10 point ROS is negative in detail.    Physical Exam:  Blood pressure (!) 134/94, pulse 96, temperature 98.7 °F (37.1 °C), temperature source Oral, resp. rate 18, height 67\", weight 138 lb 14.2 oz (63 kg), SpO2 100%, not currently breastfeeding.  Temp (24hrs), Av °F (36.7 °C), Min:97.5 °F (36.4 °C), Max:98.7 °F (37.1 °C)    Wt Readings from Last 3 Encounters:    12/10/24 138 lb 14.2 oz (63 kg)   09/07/21 143 lb (64.9 kg)   08/31/21 143 lb (64.9 kg)       General: Awake and alert; in no acute distress  HEENT: Extraocular movements are intact; sclerae are anicteric; scalp is atraumatic  Neck: Supple; no JVD; no carotid bruits  Cardiac: Regular rate and regular rhythm; normal S1 and S2, no murmurs, rubs, or gallops are appreciated  Lungs: Clear to auscultation bilaterally; no accessory muscle use is noted, no wheezes, rhonci or rales  Abdomen: Soft, non-distended, non-tender; bowel sounds are normoactive  Extremities: Warm, no edema, clubbing or cyanosis; moves all 4 extremities normally, distal pulses intact and equal  Psychiatric: Normal mood and affect; answers questions appropriately  Dermatologic: No rashes; normal skin turgor    Diagnostic testing:    Labs:   Lab Results   Component Value Date    INR 1.0 (L) 07/11/2008        Lab Results   Component Value Date    WBC 6.7 12/10/2024    HGB 13.3 12/10/2024    HCT 41.7 12/10/2024    .0 12/10/2024    CREATSERUM 1.54 12/10/2024    BUN 30 12/10/2024     12/10/2024    K 4.2 12/10/2024     12/10/2024    CO2 32.0 12/10/2024     12/10/2024    CA 9.1 12/10/2024    ALB 3.7 12/10/2024    ALKPHO 143 12/10/2024    BILT 0.8 12/10/2024    TP 6.2 12/10/2024    AST 41 12/10/2024    ALT 65 12/10/2024    MG 1.9 12/10/2024       Cardiac diagnostics:    Echo 12/8/24  1. Left ventricle: The cavity size was normal. Wall thickness was normal.      Systolic function was mildly reduced. The estimated ejection fraction was      40-45%. There was mild diffuse hypokinesis. Unable to assess LV diastolic      function due to heart rhythm.   2. Left atrium: The atrium was moderately to markedly dilated.   3. Right atrium: The atrium was dilated.   4. Aortic valve: There was mild regurgitation.   5. Mitral valve: There was mild regurgitation.   6. Tricuspid valve: There was moderate-severe regurgitation.   7. Pulmonary  arteries: Systolic pressure was mildly to moderately increased,      estimated to be 47mm Hg.     CXR 12/7/2024   CONCLUSION:  Cardiomegaly.  Mild pulmonary venous congestion.  Mild prominence of the interstitial markings in the mid-lungs and bases concerning for pulmonary edema.    Bibasilar atelectasis.    Blunting of the right costophrenic angle.     EKG 12/8/2024:   Atrial fibrillation   Left axis deviation   Moderate voltage criteria for LVH, may be normal variant ( R in aVL , [place] product )   Nonspecific ST and T wave abnormality     Echo 8/17/21:  1. Left ventricle: The cavity size is normal. Wall thickness is mildly      increased. Systolic function is normal. The estimated ejection fraction      is 55-60%. Wall motion is normal; there are no regional wall motion      abnormalities. Doppler parameters are consistent with abnormal left      ventricular relaxation (grade 1 diastolic dysfunction).   2. Right ventricle: The cavity size is normal. Wall thickness is normal.      Systolic function is normal. Estimation of the right ventricular systolic      pressure is within the normal range.   3. Aortic valve: Trileaflet; mildly thickened, mildly calcified leaflets.      There is mild to moderate regurgitation.   4. Mitral valve: Mildly calcified annulus.   5. Tricuspid valve: There is mild regurgitation.   6. Pericardium, extracardiac: There is no significant pericardial effusion.     Impression:  89 year old female presenting with DONATO, edema, orthopnea, ~ 3 lb wt gain  Acute HF - may have been exacerbated by HTN, AF RVR  Cardiomyopathy - mild, new LVEF 45% echo 12/8/24. Suspect tachy mediated.   Pers AF   CHADS VASC 4  H/o sinus yaritza  CKD 3  Transaminitis    Recommendations:  Recently amio started in place of Toprol (was on 100mg nightly). Now with AF RVR, restarted metoprolol and weaned off dilt drip. HR 80s today.   S/p IV lasix, wt down to 138 lbs. Appears compensated now, changed to PO lasix. Suspect  pulm congestion triggered by AF RVR and HTN.   Surveillance echo as outpatient, if EF does not improve with HR control, will need to do ischemic evaluation. D/w patient.   Continue Eliquis 2.5mg BID  Replete K, follow renal function  Unclear etiology for LFTs- ? Amio effect, Passive congestion. Trending down with diuresis.   OK for dc from my standpoint. F/u 1 week office.     Thank you for allowing our practice to participate in the care of your patient. Please do not hesitate to contact me if you have any questions.    Jerel Francois MD  Interventional Cardiology  [facility]  Office: [phone]       [1] No Known Allergies

## 2024-12-12 VITALS
RESPIRATION RATE: 18 BRPM | TEMPERATURE: 99 F | SYSTOLIC BLOOD PRESSURE: 134 MMHG | DIASTOLIC BLOOD PRESSURE: 94 MMHG | WEIGHT: 138 LBS | HEART RATE: 96 BPM | OXYGEN SATURATION: 100 % | HEIGHT: 67 IN | BODY MASS INDEX: 21.66 KG/M2

## 2024-12-12 NOTE — PROGRESS NOTES
Voicemail received; Patient returning call; patient thank you for call and advised she is doing well

## 2024-12-12 NOTE — PROGRESS NOTES
Hospital follow up.    Mykel David MD  Cardiac Electrophysiology, Cardiology  Jerel Francois MD  Cardiology  [address]  [address]  [place] [address]  [phone]    Elias Swanson MD  Internal Medicine  [address] [facility] [facility] [address]   [address]  [place] [address]  [phone]  Friday 12/13 @11:30  Existing appointment.    Attempt #2:  Left message on voicemail for patient to call transitions specialist back to schedule follow up appointments. Provided Transitions specialist scheduling phone number [phone].

## 2024-12-13 NOTE — DISCHARGE SUMMARY
[facility]   part of [facility]    DISCHARGE SUMMARY     Ursula Cox Patient Status:  Inpatient    1935 MRN [id number]   Location [facility] 2NE-A Attending No att. providers found   Hosp Day # 3 PCP Elias Swanson MD     Date of Admission: 2024  Date of Discharge: 12/10/2024  Discharge Disposition: Home or Self Care    Discharge Diagnosis: PAF with RVR, HTN urgency, Elevated LFT, Acute on chronic HFpEF    History of Present Illness: 89 year old female with PMH sig for PAF on eliquis, CKD3 came from [facility] with dyspnea.  Patient was seen in consultation with cardiology for underlying acute exacerbation of chronic heart failure with preserved EF.  Patient also found to have paroxysmal atrial fibrillation with RVR as well as hypertensive urgency.  Patient was started on IV diltiazem drip this was weaned off and titrated to a beta-blocker.  Patient amiodarone was continued.  Patient was on Eliquis which was also continued.  Patient was started IV Lasix for underlying acute heart failure exacerbation.  Patient diuresed well.  Patient's dyspnea resolved. Patient transitioned to po lasix on dc. Patient A-fib rate control improved.  Patient's blood pressure also improved with diuresis as well.  Patient did have elevated LFTs.  Initially concern that this may be secondary amiodarone.  These LFTs improved as patient was hospitalized.  No further inpatient evaluation was needed.  Patient clinically stable, vital stable, labs stable for discharge.  All consultants agreeable discharge.  Patient agreed with discharge.    Discharge Medication List:     Discharge Medications        START taking these medications        Instructions Prescription details   furosemide 40 MG Tabs  Commonly known as: Lasix      Take 1 tablet (40 mg total) by mouth daily.   Stop taking on: 2025  Quantity: 30 tablet  Refills: 2            CONTINUE taking these medications        Instructions Prescription  details   amiodarone 200 MG Tabs  Commonly known as: Pacerone      Take 1 tablet (200 mg total) by mouth daily.   Quantity: 90 tablet  Refills: 3     apixaban 2.5 MG Tabs  Commonly known as: Eliquis      Take 1 tablet (2.5 mg total) by mouth 2 (two) times daily.   Refills: 0     cholecalciferol 25 MCG (1000 UT) Tabs  Commonly known as: Vitamin D3      Take 1 tablet (1,000 Units total) by mouth daily.   Refills: 0     IRON OR      Take by mouth daily. OTC LIQUID IRON   Refills: 0     metoprolol succinate  MG Tb24  Commonly known as: Toprol XL      Take 1 tablet (100 mg total) by mouth daily.   Refills: 0     Multi-Vitamin/Minerals Tabs      Take 1 tablet by mouth daily.   Refills: 0            STOP taking these medications      amoxicillin 500 MG Caps  Commonly known as: Amoxil                  Where to Get Your Medications        These medications were sent to [id number] - [place], [place] - [address] AT [facility] [address], [phone], [phone]  [address], [facility] [address]      Phone: [phone]   furosemide 40 MG Tabs       Follow-up appointment:   Elias Swanson MD  [address]  [address]  [facility] [id number] [phone]    Schedule an appointment as soon as possible for a visit in 2 week(s)      Mykel David MD  [address]  [facility] [address] [phone]    Schedule an appointment as soon as possible for a visit in 1 week(s)      Appointments for Next 30 Days 12/13/2024 - 1/12/2025      None            OBJECTIVE:  Temp:  [97.5 °F (36.4 °C)-97.9 °F (36.6 °C)] 97.9 °F (36.6 °C)  Pulse:  [] 97  Resp:  [16-18] 17  BP: (131-157)/() 131/91  SpO2:  [90 %-99 %] 99 %     Intake/Output:     Intake/Output Summary (Last 24 hours) at 12/10/2024 0640  Last data filed at 12/9/2024 2100      Gross per 24 hour   Intake 611.54 ml   Output 550 ml   Net 61.54 ml              Last 3 Weights   12/10/24 0516 138 lb 14.2 oz (63 kg)   12/09/24 0500 140 lb 3.4 oz  (63.6 kg)   12/08/24 0300 142 lb 6.7 oz (64.6 kg)   12/07/24 1615 145 lb 4.5 oz (65.9 kg)   12/07/24 1438 142 lb (64.4 kg)   09/07/21 0835 143 lb (64.9 kg)   08/31/21 1050 143 lb (64.9 kg)         Exam  Gen: No acute distress  Pulm: Lungs clear, normal respiratory effort, no crackles, no wheezing  CV: tachy, irregular  Abd: Abdomen soft, nontender, nondistended,  bowel sounds present  MSK: no significant pitting edema or tenderness of the LE  Skin: no new rashes or lesions  -----------------------------------------------------------------------------------------------  PATIENT DISCHARGE INSTRUCTIONS: See electronic chart     Assessment/Plan:      89 year old female with PMH sig for PAF on eliquis, CKD3 came from [facility] with dyspnea.      Dyspnea  Acute on chronic HFpEF exacerbation  - cont IV lasix BID--> possible PO later today --> po started on dc  - daily weights, I/Os. Net neg almost 2L if accurate, down about 3 lbs  - echo in chart  - cardiology following--> ok with dc     PAF with AVR--> improving  - amio  - on dilt gtt, trying to wean off and initiate bb--> may need to go up on BB  - eliquis   - tele      Hypertensive urgency- bp improving  - prn labetalol while inpt  - metoprolol      CKD3, baseline cr 1.4-1.7  Mild hypernatremia--> resolved  - monitor Na and Cr with diuresis.   - avoid nephrotoxins     Elevated LFT--> improved  -etiology uncertain  -may be related to amio  -repeat levels today      Dispo: discharge today      Dawit Waters MD  [facility] Hospitalist  [phone]    Time spent:  > 35 minutes

## 2025-02-20 VITALS — BODY MASS INDEX: 22.44 KG/M2 | WEIGHT: 143 LBS | HEIGHT: 67 IN

## 2025-02-20 RX ORDER — DAPAGLIFLOZIN 10 MG/1
10 TABLET, FILM COATED ORAL DAILY
COMMUNITY

## 2025-02-20 NOTE — PAT NURSING NOTE
Patient denies missing any doses of Eliquis in last month.    PreOp Instructions     You are scheduled for: a Cardiac Procedure     Date of Procedure: 02/28/25 Friday     Diet Instructions: Do not eat or drink anything after midnight including gum, mints, candy, etc.     Medications: Medications you are allowed to take can be taken with a sip of water the morning of your procedure     Medications to Stop: Hold herbal supplements and vitamins morning of procedure     Other Medications: Morning of procedure/ hold (do not take) Furosemide or Farxiga (Dapaglifizon).      Do NOT miss any doses of your Eliquis prior to procedure. Please notify office for any missed doses.     Arrival Time: The day prior to your procedure you will receive a phone call before 6:00 pm with your arrival time. If you haven't received a phone call, please check your voicemail messages., If you did not receive a voice mail and it is after 6:00 pm, please call the nursing supervisor at [phone].    Driving After Procedure: Sedation will be given so you WILL NOT be able to drive home. You will need a responsible adult  to drive you home. You can NOT take uber or taxi unless approved by your physician in advance.     Discharge Teaching: Your nurse will give you specific instructions before discharge, Most people can resume normal activities in 2-3 days, Any questions, please call the physician's office      parking is available starting at 6 am or park in the [place] parking garage at [facility]. Check in at the [facility] reception desk. Our  will be there to check you in for your procedure. Please bring your insurance cards and ID with you.                                                                                                                                      Please DO NOT respond to this message, the inbasket is not monitored for messages. For any questions, please call the physician's  office.

## 2025-02-27 NOTE — PAT NURSING NOTE
PreOp Instructions     You are scheduled for: a Cardiac Procedure     Date of Procedure: 03/04/25 Tuesday     Diet Instructions: Do not eat or drink anything after midnight including gum, mints, candy, etc.     Medications: Medications you are allowed to take can be taken with a sip of water the morning of your procedure. Do not miss any doses of ELIQUIS prior to procedure, please notify office for any missed doses.     Medications to Stop: Hold herbal supplements and vitamins morning of procedure     Other Medications: Morning of procedure, hold/do NOT take Farxiga or Furosemide/Lasix     Arrival Time: The day prior to your procedure you will receive a phone call before 6:00 pm with your arrival time. If you haven't received a phone call, please check your voicemail messages., If you did not receive a voice mail and it is after 6:00 pm, please call the nursing supervisor at [phone].    Driving After Procedure: Sedation will be given so you WILL NOT be able to drive home. You will need a responsible adult  to drive you home. You can NOT take uber or taxi unless approved by your physician in advance.     Discharge Teaching: Your nurse will give you specific instructions before discharge, Most people can resume normal activities in 2-3 days, Any questions, please call the physician's office      parking is available starting at 6 am or park in the [place] parking garage at [facility]. Check in at the [facility] reception desk. Our  will be there to check you in for your procedure. Please bring your insurance cards and ID with you.                                                                                                                                      Please DO NOT respond to this message, the inbasket is not monitored for messages. For any questions, please call the physician's office.

## 2025-02-28 ENCOUNTER — HOSPITAL ENCOUNTER (OUTPATIENT)
Dept: INTERVENTIONAL RADIOLOGY/VASCULAR | Facility: HOSPITAL | Age: 89
Discharge: HOME | End: 2025-02-28
Attending: INTERNAL MEDICINE
Payer: MEDICARE

## 2025-03-04 NOTE — H&P
89 year old female    She lives in [place] but visits her son here in the summers.    She had an episode of atrial fibrillation in 2000    She gets episodes of palpitations lasting 30 minutes every 3 months that don't bother her much.    Recently however, she had a much longer episode. She tried taking her pulse and it was very fast so she went to the ER on [facility], where she was diagnosed with atrial fibrillation with RVR. It lasted about 10 hours and converted spontaneously. She was started on digoxin, metoprolol, and ASA    She then flew to the [place] area.    She otherwise feels well    As of 7/9/2019,   2 episodes: 1 and 3/2019. Lasted 4 and 8 hours.  Going back to [place] [place] next month    As of 11/8/2019,  She was hospitalized for pneumonia or bronchitis in [place] [facility] [place]. She was in atrial fibrillation. She seems to be in atrial fibrillation all the time. Her EF was decreased on an echo to around 35%.    She called to schedule an ablation and is now here to talk about it. Her family is with her.    She thinks she is always in atrial fibrillation. She has some fatigue and dyspnea on exertion     As of 1/14/2020,  S/p cardioversion with amiodarone 12/13/2019. Felt better even when started amiodarone.   Then back in atrial fibrillation 12/23/2019. Metoprolol increased  Today in SR 40  Despite these changes in rhythm, feels just as well as when started amiodarone  She was given lasix 10mg daily for an elevated BNP but instead took famotidine    As of 7/15/2022,  She is doing well but lots of stress as her daughter has metastatic cancer   Not noticing any atrial fibrillation    As of 7/21/2023,  Her daughter passed but she now has moved here from [place] [place]  Feels well    As of 8/27/2024,  HRs have been over 100 at last several visits as in the Snapshot. Clearly in persistent atrial fibrillation again  Had some vertigo recently. Meclizine helped    As of 10/18/2024,  Amiodarone stopped as not symptomatic and toprol  started for rate control  Feels great    As of 2024,  Toprol xl increased to 100mg daily last visit  Not sleeping well    As of 2025,  Was at [facility] 2025 with congestive heart failure and hypertension and atrial fibrillation with RVR  Now back on amiodarone     As of 3/4/2025   Here for CV  Hasn't missed AC doses    Denies chest pain, shortness of breath, palpitations, orthopnea, paroxysmal nocturnal dyspnea, edema, lightheadedness, or syncope.    Medications:  Current Outpatient Medications:   furosemide 40 MG Oral Tab, Take 0.5 tablets (20 mg total) by mouth daily., Disp: , Rfl:   dapagliflozin (FARXIGA) 10 MG Oral Tab, Take 1 tablet (10 mg total) by mouth daily., Disp: 30 tablet, Rfl: 2  amiodarone 200 MG Oral Tab, Take 1 tablet (200 mg total) by mouth daily., Disp: 90 tablet, Rfl: 0  Multiple Vitamins-Minerals (MULTI-VITAMIN/MINERALS) Oral Tab, Take 1 tablet by mouth daily., Disp: , Rfl:   cholecalciferol 25 MCG (1000 UT) Oral Tab, Take 1,000 Units by mouth daily., Disp: , Rfl:   IRON OR, Take by mouth daily., Disp: , Rfl:   ELIQUIS 2.5 MG Oral Tab, TAKE 1 TABLET BY MOUTH TWICE DAILY, Disp: 180 tablet, Rfl: 3  metoprolol succinate ER (TOPROL XL) 100 MG Oral Tablet 24 Hr, Take 1 tablet (100 mg total) by mouth daily., Disp: 90 tablet, Rfl: 3    Physical:  /70  Pulse 99  Ht 5' 7\" (1.702 m)  Wt 143 lb (64.9 kg)  SpO2 98%  BMI 22.40 kg/m²   GENERAL: well developed, well nourished, in no apparent distress  EYES: sclera anicteric  HEENT: normocephalic  NECK: no JVD, no carotid bruits, no thyromegaly  RESPIRATORY: clear to auscultation  CARDIOVASCULAR: S1, S2 normal, IRRR; no S3, no S4; no click; no murmur  ABDOMEN: normal active BS, soft, nondistended; nontender  EXTREMITIES: no cyanosis, clubbing or edema, peripheral pulses intact    Data:  EC2024: atrial fibrillation 95  EC2023: SR 51 QTc 420  EC/15/2022: SR 62 QTc 430  EC2020: SR 40 QTc 400  EC2019: atrial  fibrillation 100  EC2019: SR 63  EC/15/2018: SR 51  EC2018: atrial fibrillation 106  Echo: 2018: EF normal, normal LA size  Echo: 2019: EF 40-45, mod LAE  Echo: 2021: EF 55-60, normal LA size, mild-mod AI  Echo: 2024: EF 40-45, mod-marked LAE, mod-sever TR  LFT, tsh: 2024: normal    Impression:  persistent atrial fibrillation   Sinus bradycardia  cardiomyopathy    Plan:  Discussed options including rate control, anti-arrhythmic drugs, and catheter ablation.   CHADS-Vasc=4. Eliquis  Increase amioadrone to 200mg bid. The risks of amiodarone were discussed including, but not limited to, liver, pulmonary, thyroid, eye, and skin toxicity as well as rare proarrhythmia. Avoid medications which prolong QT.   Cardioversion. The risks (including, but not limited to, respiratory failure, myocardial infarction, stroke, and death), benefits (restoration of sinus rhythm), and alternatives (rate control, ablation) of the procedure were discussed. The patient understands and agrees to proceed.  toprol xl 100mg nightly

## 2025-03-04 NOTE — PROCEDURES
Cardioversion      History:  89 year old female with persistent atrial fibrillation who presents for a cardioversion. The risks (including, but not limited to, respiratory failure, myocardial infarction, stroke, and death), benefits (restoration of sinus rhythm), and alternatives (rate control, ablation) of the procedure were discussed. The patient understands and agrees to proceed.    Procedure:  The patient was brought to the invasive lab holding room in a fasting and nonsedated state. Brevital was administered intravenously in divided doses under continuous monitoring of oxygenation, blood pressure, and heart rate.     atrial fibrillation --> 360J sync --> SR    The patient awoke spontaneously without any apparent periprocedural complications  ECG: SR 55 QTc 460    Conclusions:  Acutely successful cardioversion of atrial fibrillation to SR  Continue amiodarone at 200mg bid  Decrease toprol xl to 50mg daily  F/u 1m      Mykel David MD  Clinical Cardiac Electrophysiology  [facility]

## 2025-03-04 NOTE — DISCHARGE INSTRUCTIONS
HOME CARE INSTRUCTIONS FOLLOWING CARDIOVERSION OR ICD EVALUATION      Activity:  - DO NOT drive after the procedure. You may resume driving after 24 hours.  - DO NOT operate any machinery (including kitchen appliances or power tools).  - Avoid drinking alcohol for 24 hours.  - You may resume your normal activity after 24 hours.    What is Normal:  - Your skin may be red where the patches were placed.  - The redness may last 2 to 3 days and feel like a \"sunburn\".  - You may treat the area with an over-the-counter cream that is used for sunburned skin.    Special Instructions:  - If you were taking the medication Eliquis, Xarelto, Pradaxa or Coumadin, it is very important to continue taking it until your follow-up appointment with your physician.    When you should NOTIFY YOUR PHYSICIAN:  - If you feel that you have returned to an irregular rhythm  - If you have an ICD, any time your ICD device fires    Other:  - You may resume your present diet, unless otherwise specified by your physician.  - You may resume all of your medications as prescribed, unless otherwise directed by your physician.  - A list of your medications was provided to you at discharge.  - Please call your physician's office for a follow-up appointment. You should be seen in 2 weeks.  - Do not make any personal/business decisions and/or sign any legal documents for the next 24 hours.       Monitor heart rate daily, if heart rate is consistently in the 40's, stop taking metoprolol.

## 2025-03-04 NOTE — PROGRESS NOTES
Pt post cardioversion  After adequate sedation per Dr. David, pt cardioverted x 1 360j. Pt converted to NSR per EKG. Vss. Pt tolerated well.     Pt awake, tolerating po    Recovery complete per protocol. Vss. Discharge instructions reviewed, iv dc'd and pt discharged home with son driving.

## 2025-03-06 NOTE — PROGRESS NOTES
Discharged 3/04 ([facility])        [facility] Request :  Dr. Mykel David in 1 month office will call you with follow up appointment date and time [facility] [address] [facility] [id number] [phone]  Friday 4/11 @12pm w/ Dr. Mykel David (existing appt), pt states that she's ok with this date it's one month out, and doesn't need to moved sooner      PCP Request :  Elias Swanson MD   [address] [facility]    [address]   [place] [address]   [phone] (Work)   DECLINED - pt states that she's ok with her Cardiology follow-up appts & she's in touch with Dr. Swanson who is very well aware of everything that's going        No further assistance needed      Closing encounter

## (undated) DEVICE — APPLICATOR COTTON TIP 3 10/PK

## (undated) DEVICE — SYRINGE 3ML LL TIP

## (undated) DEVICE — Device

## (undated) DEVICE — SUTURE PLAIN GUT 6-0 PC-1

## (undated) DEVICE — TOWEL: OR BLU 80/CS: Brand: MEDICAL ACTION INDUSTRIES

## (undated) DEVICE — ANGLED MICRO-NEEDLE ELECTRODE: Brand: VALLEYLAB

## (undated) DEVICE — OPHTHALMIC KNIFE 15°: Brand: ALCON

## (undated) DEVICE — SOL  .9 1000ML BTL

## (undated) DEVICE — KENDALL SCD EXPRESS SLEEVES, KNEE LENGTH, MEDIUM: Brand: KENDALL SCD

## (undated) DEVICE — STANDARD HYPODERMIC NEEDLE,POLYPROPYLENE HUB: Brand: MONOJECT

## (undated) DEVICE — DISPOSABLE BIPOLAR FORCEPS 4" (10.2CM) JEWELERS, STRAIGHT 0.4MM TIP AND 12 FT. (3.6M) CABLE: Brand: KIRWAN

## (undated) DEVICE — GLOVE BIOGEL M SURG SZ 8-1/2

## (undated) DEVICE — PREP BETADINE SOL 5% EYE

## (undated) DEVICE — #11 STERILE BLADE: Brand: POLYMER COATED BLADES

## (undated) DEVICE — HEAD AND NECK CDS-LF: Brand: MEDLINE INDUSTRIES, INC.

## (undated) NOTE — LETTER
[facility] [address], [address]  Consent for Procedure/Sedation    Date:     Time:       1. I authorize the performance upon Francis Sprague the following:  Cardioversion.      2. I authorize Dr. Armida Lennon (and whomever is de ________________________________    ___________________    Witness: _________________________      Date: ___________________    Printed: 2019   1:23 PM  Patient Name: Caitlin Nickolas        : 1935       Medical Record #: [id number]